# Patient Record
Sex: MALE | Race: WHITE | Employment: OTHER | URBAN - METROPOLITAN AREA
[De-identification: names, ages, dates, MRNs, and addresses within clinical notes are randomized per-mention and may not be internally consistent; named-entity substitution may affect disease eponyms.]

---

## 2022-04-12 ENCOUNTER — APPOINTMENT (OUTPATIENT)
Dept: CT IMAGING | Age: 77
DRG: 872 | End: 2022-04-12
Payer: MEDICARE

## 2022-04-12 ENCOUNTER — HOSPITAL ENCOUNTER (INPATIENT)
Age: 77
LOS: 3 days | Discharge: HOME HEALTH | DRG: 872 | End: 2022-04-16
Attending: EMERGENCY MEDICINE | Admitting: STUDENT IN AN ORGANIZED HEALTH CARE EDUCATION/TRAINING PROGRAM
Payer: MEDICARE

## 2022-04-12 ENCOUNTER — APPOINTMENT (OUTPATIENT)
Dept: GENERAL RADIOLOGY | Age: 77
DRG: 872 | End: 2022-04-12
Payer: MEDICARE

## 2022-04-12 DIAGNOSIS — D72.829 LEUKOCYTOSIS, UNSPECIFIED TYPE: Primary | ICD-10-CM

## 2022-04-12 DIAGNOSIS — N28.9 RENAL INSUFFICIENCY: ICD-10-CM

## 2022-04-12 DIAGNOSIS — N30.00 ACUTE CYSTITIS WITHOUT HEMATURIA: ICD-10-CM

## 2022-04-12 PROBLEM — N17.9 AKI (ACUTE KIDNEY INJURY) (HCC): Status: ACTIVE | Noted: 2022-04-12

## 2022-04-12 PROBLEM — R73.03 PREDIABETES: Status: ACTIVE | Noted: 2018-09-06

## 2022-04-12 PROBLEM — E03.9 ACQUIRED HYPOTHYROIDISM: Status: ACTIVE | Noted: 2018-03-01

## 2022-04-12 PROBLEM — I10 BENIGN ESSENTIAL HYPERTENSION: Status: ACTIVE | Noted: 2017-09-01

## 2022-04-12 PROBLEM — E78.2 MIXED HYPERLIPIDEMIA: Status: ACTIVE | Noted: 2017-09-01

## 2022-04-12 LAB
ALBUMIN SERPL-MCNC: 3.4 GM/DL (ref 3.4–5)
ALP BLD-CCNC: 54 IU/L (ref 40–129)
ALT SERPL-CCNC: 33 U/L (ref 10–40)
ANION GAP SERPL CALCULATED.3IONS-SCNC: 14 MMOL/L (ref 4–16)
AST SERPL-CCNC: 33 IU/L (ref 15–37)
BACTERIA: ABNORMAL /HPF
BASOPHILS ABSOLUTE: 0.1 K/CU MM
BASOPHILS RELATIVE PERCENT: 0.2 % (ref 0–1)
BILIRUB SERPL-MCNC: 0.6 MG/DL (ref 0–1)
BILIRUBIN URINE: NEGATIVE MG/DL
BLOOD, URINE: ABNORMAL
BUN BLDV-MCNC: 29 MG/DL (ref 6–23)
CALCIUM SERPL-MCNC: 9.6 MG/DL (ref 8.3–10.6)
CHLORIDE BLD-SCNC: 96 MMOL/L (ref 99–110)
CLARITY: ABNORMAL
CO2: 22 MMOL/L (ref 21–32)
COLOR: YELLOW
CREAT SERPL-MCNC: 1.8 MG/DL (ref 0.9–1.3)
DIFFERENTIAL TYPE: ABNORMAL
EOSINOPHILS ABSOLUTE: 0 K/CU MM
EOSINOPHILS RELATIVE PERCENT: 0.1 % (ref 0–3)
GFR AFRICAN AMERICAN: 45 ML/MIN/1.73M2
GFR NON-AFRICAN AMERICAN: 37 ML/MIN/1.73M2
GLUCOSE BLD-MCNC: 127 MG/DL (ref 70–99)
GLUCOSE BLD-MCNC: 128 MG/DL (ref 70–99)
GLUCOSE, URINE: NEGATIVE MG/DL
HCT VFR BLD CALC: 41.6 % (ref 42–52)
HEMOGLOBIN: 13.6 GM/DL (ref 13.5–18)
IMMATURE NEUTROPHIL %: 0.7 % (ref 0–0.43)
KETONES, URINE: NEGATIVE MG/DL
LACTATE: 1.4 MMOL/L (ref 0.4–2)
LEUKOCYTE ESTERASE, URINE: ABNORMAL
LYMPHOCYTES ABSOLUTE: 2 K/CU MM
LYMPHOCYTES RELATIVE PERCENT: 7.9 % (ref 24–44)
MCH RBC QN AUTO: 30.8 PG (ref 27–31)
MCHC RBC AUTO-ENTMCNC: 32.7 % (ref 32–36)
MCV RBC AUTO: 94.1 FL (ref 78–100)
MONOCYTES ABSOLUTE: 2.9 K/CU MM
MONOCYTES RELATIVE PERCENT: 11.2 % (ref 0–4)
MUCUS: ABNORMAL HPF
NITRITE URINE, QUANTITATIVE: NEGATIVE
NUCLEATED RBC %: 0 %
PDW BLD-RTO: 13.9 % (ref 11.7–14.9)
PH, URINE: 5.5 (ref 5–8)
PLATELET # BLD: 380 K/CU MM (ref 140–440)
PMV BLD AUTO: 10.2 FL (ref 7.5–11.1)
POTASSIUM SERPL-SCNC: 3.9 MMOL/L (ref 3.5–5.1)
PRO-BNP: 526.2 PG/ML
PROTEIN UA: ABNORMAL MG/DL
RBC # BLD: 4.42 M/CU MM (ref 4.6–6.2)
RBC URINE: 4 /HPF (ref 0–3)
SARS-COV-2, NAAT: NOT DETECTED
SEGMENTED NEUTROPHILS ABSOLUTE COUNT: 20.7 K/CU MM
SEGMENTED NEUTROPHILS RELATIVE PERCENT: 79.9 % (ref 36–66)
SODIUM BLD-SCNC: 132 MMOL/L (ref 135–145)
SOURCE: NORMAL
SPECIFIC GRAVITY UA: 1.02 (ref 1–1.03)
SQUAMOUS EPITHELIAL: 1 /HPF
T4 FREE: 1.24 NG/DL (ref 0.9–1.8)
TOTAL CK: 118 IU/L (ref 38–174)
TOTAL IMMATURE NEUTOROPHIL: 0.17 K/CU MM
TOTAL NUCLEATED RBC: 0 K/CU MM
TOTAL PROTEIN: 7.9 GM/DL (ref 6.4–8.2)
TROPONIN T: <0.01 NG/ML
TSH HIGH SENSITIVITY: 1.22 UIU/ML (ref 0.27–4.2)
UROBILINOGEN, URINE: NORMAL MG/DL (ref 0.2–1)
WBC # BLD: 25.9 K/CU MM (ref 4–10.5)
WBC CLUMP: ABNORMAL /HPF
WBC UA: 110 /HPF (ref 0–2)

## 2022-04-12 PROCEDURE — 87635 SARS-COV-2 COVID-19 AMP PRB: CPT

## 2022-04-12 PROCEDURE — 85025 COMPLETE CBC W/AUTO DIFF WBC: CPT

## 2022-04-12 PROCEDURE — 96361 HYDRATE IV INFUSION ADD-ON: CPT

## 2022-04-12 PROCEDURE — 82140 ASSAY OF AMMONIA: CPT

## 2022-04-12 PROCEDURE — G0378 HOSPITAL OBSERVATION PER HR: HCPCS

## 2022-04-12 PROCEDURE — 36415 COLL VENOUS BLD VENIPUNCTURE: CPT

## 2022-04-12 PROCEDURE — 93005 ELECTROCARDIOGRAM TRACING: CPT | Performed by: EMERGENCY MEDICINE

## 2022-04-12 PROCEDURE — 2580000003 HC RX 258: Performed by: EMERGENCY MEDICINE

## 2022-04-12 PROCEDURE — 71045 X-RAY EXAM CHEST 1 VIEW: CPT

## 2022-04-12 PROCEDURE — 83880 ASSAY OF NATRIURETIC PEPTIDE: CPT

## 2022-04-12 PROCEDURE — 6370000000 HC RX 637 (ALT 250 FOR IP): Performed by: STUDENT IN AN ORGANIZED HEALTH CARE EDUCATION/TRAINING PROGRAM

## 2022-04-12 PROCEDURE — 83605 ASSAY OF LACTIC ACID: CPT

## 2022-04-12 PROCEDURE — 84439 ASSAY OF FREE THYROXINE: CPT

## 2022-04-12 PROCEDURE — 96365 THER/PROPH/DIAG IV INF INIT: CPT

## 2022-04-12 PROCEDURE — 81001 URINALYSIS AUTO W/SCOPE: CPT

## 2022-04-12 PROCEDURE — 80053 COMPREHEN METABOLIC PANEL: CPT

## 2022-04-12 PROCEDURE — 84484 ASSAY OF TROPONIN QUANT: CPT

## 2022-04-12 PROCEDURE — 82962 GLUCOSE BLOOD TEST: CPT

## 2022-04-12 PROCEDURE — 87040 BLOOD CULTURE FOR BACTERIA: CPT

## 2022-04-12 PROCEDURE — 6360000002 HC RX W HCPCS: Performed by: EMERGENCY MEDICINE

## 2022-04-12 PROCEDURE — 74176 CT ABD & PELVIS W/O CONTRAST: CPT

## 2022-04-12 PROCEDURE — 99284 EMERGENCY DEPT VISIT MOD MDM: CPT

## 2022-04-12 PROCEDURE — 84443 ASSAY THYROID STIM HORMONE: CPT

## 2022-04-12 PROCEDURE — 82550 ASSAY OF CK (CPK): CPT

## 2022-04-12 PROCEDURE — 71250 CT THORAX DX C-: CPT

## 2022-04-12 RX ORDER — HYDROCHLOROTHIAZIDE 25 MG/1
6.25 TABLET ORAL DAILY
Status: DISCONTINUED | OUTPATIENT
Start: 2022-04-12 | End: 2022-04-13

## 2022-04-12 RX ORDER — LEVOTHYROXINE SODIUM 0.03 MG/1
50 TABLET ORAL DAILY
Status: DISCONTINUED | OUTPATIENT
Start: 2022-04-13 | End: 2022-04-16 | Stop reason: HOSPADM

## 2022-04-12 RX ORDER — ONDANSETRON 4 MG/1
4 TABLET, ORALLY DISINTEGRATING ORAL EVERY 8 HOURS PRN
Status: DISCONTINUED | OUTPATIENT
Start: 2022-04-12 | End: 2022-04-16 | Stop reason: HOSPADM

## 2022-04-12 RX ORDER — POLYETHYLENE GLYCOL 3350 17 G/17G
17 POWDER, FOR SOLUTION ORAL DAILY PRN
Status: DISCONTINUED | OUTPATIENT
Start: 2022-04-12 | End: 2022-04-16 | Stop reason: HOSPADM

## 2022-04-12 RX ORDER — FENOFIBRATE 160 MG/1
160 TABLET ORAL DAILY
Status: DISCONTINUED | OUTPATIENT
Start: 2022-04-13 | End: 2022-04-16 | Stop reason: HOSPADM

## 2022-04-12 RX ORDER — ONDANSETRON 2 MG/ML
4 INJECTION INTRAMUSCULAR; INTRAVENOUS EVERY 6 HOURS PRN
Status: DISCONTINUED | OUTPATIENT
Start: 2022-04-12 | End: 2022-04-16 | Stop reason: HOSPADM

## 2022-04-12 RX ORDER — LOVASTATIN 40 MG/1
60 TABLET ORAL NIGHTLY
COMMUNITY

## 2022-04-12 RX ORDER — ATORVASTATIN CALCIUM 10 MG/1
10 TABLET, FILM COATED ORAL DAILY
Status: DISCONTINUED | OUTPATIENT
Start: 2022-04-12 | End: 2022-04-16 | Stop reason: HOSPADM

## 2022-04-12 RX ORDER — ACETAMINOPHEN 325 MG/1
650 TABLET ORAL EVERY 6 HOURS PRN
Status: DISCONTINUED | OUTPATIENT
Start: 2022-04-12 | End: 2022-04-16 | Stop reason: HOSPADM

## 2022-04-12 RX ORDER — BISOPROLOL FUMARATE AND HYDROCHLOROTHIAZIDE 10; 6.25 MG/1; MG/1
1 TABLET ORAL DAILY
COMMUNITY

## 2022-04-12 RX ORDER — METOPROLOL SUCCINATE 50 MG/1
100 TABLET, EXTENDED RELEASE ORAL DAILY
Status: DISCONTINUED | OUTPATIENT
Start: 2022-04-12 | End: 2022-04-16 | Stop reason: HOSPADM

## 2022-04-12 RX ORDER — BISOPROLOL FUMARATE AND HYDROCHLOROTHIAZIDE 10; 6.25 MG/1; MG/1
1 TABLET ORAL DAILY
Status: DISCONTINUED | OUTPATIENT
Start: 2022-04-12 | End: 2022-04-12 | Stop reason: CLARIF

## 2022-04-12 RX ORDER — LEVOTHYROXINE SODIUM 0.05 MG/1
1 TABLET ORAL DAILY
COMMUNITY
Start: 2022-02-04

## 2022-04-12 RX ORDER — ACETAMINOPHEN 650 MG/1
650 SUPPOSITORY RECTAL EVERY 6 HOURS PRN
Status: DISCONTINUED | OUTPATIENT
Start: 2022-04-12 | End: 2022-04-16 | Stop reason: HOSPADM

## 2022-04-12 RX ORDER — AMLODIPINE BESYLATE 10 MG/1
10 TABLET ORAL DAILY
COMMUNITY

## 2022-04-12 RX ORDER — AMLODIPINE BESYLATE 5 MG/1
10 TABLET ORAL DAILY
Status: DISCONTINUED | OUTPATIENT
Start: 2022-04-12 | End: 2022-04-16 | Stop reason: HOSPADM

## 2022-04-12 RX ORDER — HEPARIN SODIUM 5000 [USP'U]/ML
5000 INJECTION, SOLUTION INTRAVENOUS; SUBCUTANEOUS EVERY 8 HOURS SCHEDULED
Status: DISCONTINUED | OUTPATIENT
Start: 2022-04-13 | End: 2022-04-16 | Stop reason: HOSPADM

## 2022-04-12 RX ORDER — SODIUM CHLORIDE 9 MG/ML
INJECTION, SOLUTION INTRAVENOUS CONTINUOUS
Status: DISPENSED | OUTPATIENT
Start: 2022-04-12 | End: 2022-04-13

## 2022-04-12 RX ORDER — FENOFIBRATE 160 MG/1
160 TABLET ORAL DAILY
COMMUNITY

## 2022-04-12 RX ORDER — SODIUM CHLORIDE 0.9 % (FLUSH) 0.9 %
5-40 SYRINGE (ML) INJECTION 2 TIMES DAILY
Status: DISCONTINUED | OUTPATIENT
Start: 2022-04-13 | End: 2022-04-16 | Stop reason: HOSPADM

## 2022-04-12 RX ORDER — BISOPROLOL FUMARATE 100 %
POWDER (GRAM) MISCELLANEOUS
Status: ON HOLD | COMMUNITY
End: 2022-04-16 | Stop reason: HOSPADM

## 2022-04-12 RX ORDER — SODIUM CHLORIDE 0.9 % (FLUSH) 0.9 %
5-40 SYRINGE (ML) INJECTION PRN
Status: DISCONTINUED | OUTPATIENT
Start: 2022-04-12 | End: 2022-04-16 | Stop reason: HOSPADM

## 2022-04-12 RX ORDER — SODIUM CHLORIDE 9 MG/ML
INJECTION, SOLUTION INTRAVENOUS PRN
Status: DISCONTINUED | OUTPATIENT
Start: 2022-04-12 | End: 2022-04-16 | Stop reason: HOSPADM

## 2022-04-12 RX ADMIN — CEFTRIAXONE SODIUM 1000 MG: 1 INJECTION, POWDER, FOR SOLUTION INTRAMUSCULAR; INTRAVENOUS at 20:43

## 2022-04-12 RX ADMIN — ATORVASTATIN CALCIUM 10 MG: 10 TABLET, FILM COATED ORAL at 23:28

## 2022-04-12 RX ADMIN — METOPROLOL SUCCINATE 100 MG: 50 TABLET, EXTENDED RELEASE ORAL at 23:28

## 2022-04-12 RX ADMIN — AMLODIPINE BESYLATE 10 MG: 5 TABLET ORAL at 23:28

## 2022-04-12 RX ADMIN — SODIUM CHLORIDE: 9 INJECTION, SOLUTION INTRAVENOUS at 20:40

## 2022-04-12 ASSESSMENT — ENCOUNTER SYMPTOMS
TROUBLE SWALLOWING: 0
ABDOMINAL PAIN: 0
NAUSEA: 0
WHEEZING: 0
COUGH: 0
DIARRHEA: 0
RHINORRHEA: 0
ANAL BLEEDING: 0
VOICE CHANGE: 0
COLOR CHANGE: 0
SHORTNESS OF BREATH: 1
CHEST TIGHTNESS: 0
VOMITING: 0
BLOOD IN STOOL: 0

## 2022-04-12 NOTE — ED PROVIDER NOTES
Triage Chief Complaint:   Fatigue (feels tired and \"has no energy\", states this has been going on for several weeks.)      [place]:  Sammy Saldana is a 78 y.o. male that presents to the emergency department stating he feels tired and \"has no energy for the last month\". States he gets easily short of breath with exertion, no chest pain, no Fevers or chills. States recent UTI on abx from PCP. He states those dysuria symptoms are better. He denies any diaphoresis or syncope. Past Medical History:   Diagnosis Date    Hyperlipidemia     Hypertension      History reviewed. No pertinent surgical history. History reviewed. No pertinent family history. Social History     Socioeconomic History    Marital status:      Spouse name: Not on file    Number of children: Not on file    Years of education: Not on file    Highest education level: Not on file   Occupational History    Not on file   Tobacco Use    Smoking status: Never Smoker    Smokeless tobacco: Never Used   Vaping Use    Vaping Use: Never used   Substance and Sexual Activity    Alcohol use: Never    Drug use: Never    Sexual activity: Not on file   Other Topics Concern    Not on file   Social History Narrative    Not on file     Social Determinants of Health     Financial Resource Strain:     Difficulty of Paying Living Expenses: Not on file   Food Insecurity:     Worried About [address] [employer] in the Last Year: Not on file    Merlin of Food in the Last Year: Not on file   Transportation Needs:     Lack of Transportation (Medical): Not on file    Lack of Transportation (Non-Medical):  Not on file   Physical Activity:     Days of Exercise per Week: Not on file    Minutes of Exercise per Session: Not on file   Stress:     Feeling of Stress : Not on file   Social Connections:     Frequency of Communication with Friends and Family: Not on file    Frequency of Social Gatherings with Friends and Family: Not on file    Attends Scientologist Services: Not on file    Active Member of Clubs or Organizations: Not on file    Attends Club or Organization Meetings: Not on file    Marital Status: Not on file   Intimate Partner Violence:     Fear of Current or Ex-Partner: Not on file    Emotionally Abused: Not on file    Physically Abused: Not on file    Sexually Abused: Not on file   Housing Stability:     Unable to Pay for Housing in the Last Year: Not on file    Number of [place] in the Last Year: Not on file    Unstable Housing in the Last Year: Not on file     Current Facility-Administered Medications   Medication Dose Route Frequency Provider Last Rate Last Admin    cefTRIAXone (ROCEPHIN) 1000 mg IVPB in 50 mL D5W minibag  1,000 mg IntraVENous Once Richar Grsos MD         Current Outpatient Medications   Medication Sig Dispense Refill    lovastatin (MEVACOR) 40 MG tablet Take 60 mg by mouth nightly      amLODIPine (NORVASC) 10 MG tablet Take 10 mg by mouth daily      fenofibrate (TRIGLIDE) 160 MG tablet Take 160 mg by mouth daily      Bisoprolol Fumarate POWD by Does not apply route      bisoprolol-hydroCHLOROthiazide (ZIAC) 10-6.25 MG per tablet Take 1 tablet by mouth daily       No Known Allergies  Nursing Notes Reviewed    ROS:  At least 10 systems reviewed and otherwise negative except as in the [address] Physical Exam:  ED Triage Vitals [04/12/22 1503]   Enc Vitals Group      /74      Pulse 80      Resp 14      Temp 99 °F (37.2 °C)      Temp Source Oral      SpO2 94 %      Weight 195 lb (88.5 kg)      Height 5' 11\" (1.803 m)      Head Circumference       Peak Flow       Pain Score       Pain Loc       Pain Edu? Excl. in [address]? My pulse oximetry interpretation is which is within the normal range    GENERAL APPEARANCE: Awake and alert. Cooperative. No acute distress. HEAD: Normocephalic. Atraumatic. EYES: EOM's grossly intact. Sclera anicteric. ENT: Mucous membranes are moist. Tolerates saliva.  No trismus. NECK: Supple. No meningismus. Trachea midline. HEART: RRR. Radial pulses 2+. LUNGS: Respirations unlabored. CTAB. No wheezing or stridor. No respiratory distress  ABDOMEN: Soft. Non-tender. No guarding or rebound. Normal bowel sounds. EXTREMITIES: No acute deformities. No pitting edema  SKIN: Warm and dry. NEUROLOGICAL: No gross facial drooping. Moves all 4 extremities spontaneously. PSYCHIATRIC: Normal mood.     I have reviewed and interpreted all of the currently available lab results from this visit (if applicable):  Results for orders placed or performed during the hospital encounter of 04/12/22   COVID-19, Rapid    Specimen: Nasopharyngeal   Result Value Ref Range    Source THROAT     SARS-CoV-2, NAAT NOT DETECTED NOT DETECTED   CBC with Auto Differential   Result Value Ref Range    WBC 25.9 (H) 4.0 - 10.5 K/CU MM    RBC 4.42 (L) 4.6 - 6.2 M/CU MM    Hemoglobin 13.6 13.5 - 18.0 GM/DL    Hematocrit 41.6 (L) 42 - 52 %    MCV 94.1 78 - 100 FL    MCH 30.8 27 - 31 PG    MCHC 32.7 32.0 - 36.0 %    RDW 13.9 11.7 - 14.9 %    Platelets 791 872 - 043 K/CU MM    MPV 10.2 7.5 - 11.1 FL    Differential Type AUTOMATED DIFFERENTIAL     Segs Relative 79.9 (H) 36 - 66 %    Lymphocytes % 7.9 (L) 24 - 44 %    Monocytes % 11.2 (H) 0 - 4 %    Eosinophils % 0.1 0 - 3 %    Basophils % 0.2 0 - 1 %    Segs Absolute 20.7 K/CU MM    Lymphocytes Absolute 2.0 K/CU MM    Monocytes Absolute 2.9 K/CU MM    Eosinophils Absolute 0.0 K/CU MM    Basophils Absolute 0.1 K/CU MM    Nucleated RBC % 0.0 %    Total Nucleated RBC 0.0 K/CU MM    Total Immature Neutrophil 0.17 K/CU MM    Immature Neutrophil % 0.7 (H) 0 - 0.43 %   Comprehensive Metabolic Panel   Result Value Ref Range    Sodium 132 (L) 135 - 145 MMOL/L    Potassium 3.9 3.5 - 5.1 MMOL/L    Chloride 96 (L) 99 - 110 mMol/L    CO2 22 21 - 32 MMOL/L    BUN 29 (H) 6 - 23 MG/DL    CREATININE 1.8 (H) 0.9 - 1.3 MG/DL    Glucose 128 (H) 70 - 99 MG/DL    Calcium 9.6 8.3 - 10.6 MG/DL Albumin 3.4 3.4 - 5.0 GM/DL    Total Protein 7.9 6.4 - 8.2 GM/DL    Total Bilirubin 0.6 0.0 - 1.0 MG/DL    ALT 33 10 - 40 U/L    AST 33 15 - 37 IU/L    Alkaline Phosphatase 54 40 - 129 IU/L    GFR Non- 37 (L) >60 mL/min/1.73m2    GFR  45 (L) >60 mL/min/1.73m2    Anion Gap 14 4 - 16   Troponin   Result Value Ref Range    Troponin T <0.010 <0.01 NG/ML   Brain Natriuretic Peptide   Result Value Ref Range    Pro-.2 (H) <300 PG/ML   TSH   Result Value Ref Range    TSH, High Sensitivity 1.220 0.270 - 4.20 uIu/ml   T4, Free   Result Value Ref Range    T4 Free 1.24 0.9 - 1.8 NG/DL   Urinalysis with Microscopic   Result Value Ref Range    Color, UA YELLOW YELLOW    Clarity, UA CLOUDY (A) CLEAR    Glucose, Urine NEGATIVE NEGATIVE MG/DL    Bilirubin Urine NEGATIVE NEGATIVE MG/DL    Ketones, Urine NEGATIVE NEGATIVE MG/DL    Specific Gravity, UA 1.025 1.001 - 1.035    Blood, Urine SMALL (A) NEGATIVE    pH, Urine 5.5 5.0 - 8.0    Protein, UA TRACE (A) NEGATIVE MG/DL    Urobilinogen, Urine NORMAL 0.2 - 1.0 MG/DL    Nitrite Urine, Quantitative NEGATIVE NEGATIVE    Leukocyte Esterase, Urine SMALL (A) NEGATIVE    RBC, UA 4 (H) 0 - 3 /HPF    WBC,  (H) 0 - 2 /HPF    Bacteria, UA MANY (A) NEGATIVE /HPF    WBC Clumps, UA MANY /HPF    Squam Epithel, UA 1 /HPF    Mucus, UA RARE (A) NEGATIVE HPF   Lactic Acid   Result Value Ref Range    Lactate 1.4 0.4 - 2.0 mMOL/L   CK   Result Value Ref Range    Total  38 - 174 IU/L   POCT Glucose   Result Value Ref Range    POC Glucose 127 (H) 70 - 99 MG/DL        Radiographs:  [] Radiologist's Wet Read Report Reviewed:        CT ABDOMEN PELVIS WO CONTRAST Additional Contrast? None (Final result)  Result time 04/12/22 18:39:36  Procedure changed from [place] Additional Contrast? None  Final result by Cris Lares MD (04/12/22 18:39:36)                Impression:    1.  CT CHEST: No acute abnormality.    2. Mild calcific atherosclerosis aorta and coronary arteries. 3. Otherwise unremarkable chest CT.  No finding to suggest etiology for   fatigue and elevated white blood cell count. 4. CT ABDOMEN/PELVIS: Acute cystitis suspected given the findings; correlate   with urinalysis. 5.  No findings to suggest acute appendicitis; no ureter calculus or   hydronephrosis. 6. Mild hepatic morphologic features suggestive of cirrhosis, though   nonspecific. 7. Hepatic steatosis. 8.  Calcific atherosclerotic disease aorta. Narrative:    EXAMINATION:   CT OF THE ABDOMEN AND PELVIS WITHOUT CONTRAST; CT OF THE CHEST WITHOUT   CONTRAST 4/12/2022 6:14 pm     TECHNIQUE:   CT of the abdomen and pelvis was performed without the administration of   intravenous contrast. Multiplanar reformatted images are provided for review. Dose modulation, iterative reconstruction, and/or weight based adjustment of   the mA/kV was utilized to reduce the radiation dose to as low as reasonably   achievable.; CT of the chest was performed without the administration of   intravenous contrast. Multiplanar reformatted images are provided for review. Dose modulation, iterative reconstruction, and/or weight based adjustment of   the mA/kV was utilized to reduce the radiation dose to as low as reasonably   achievable. COMPARISON:   None. HISTORY:   ORDERING SYSTEM PROVIDED HISTORY: white count 25,000, fatigue   TECHNOLOGIST PROVIDED HISTORY:   Reason for exam:-> white count 25 k, fatigue   Additional Contrast?-> none   Reason for Exam: white count 25 k, fatigue     FINDINGS:   Mediastinum: Cardiac structures and great vessels appear unremarkable with   exception of calcific atherosclerotic disease.  No pericardial effusion. Left hilar calcification typical of sequela from old granulomatous disease. Posterior mediastinal structures appear unremarkable.  No mediastinal or   hilar adenopathy.      Lungs/pleura: Densely calcified nodules lateral mid left and right lung,   typical of sequela from old granulomatous disease.  Well-expanded and   otherwise clear, without soft tissue or ground-glass pulmonary nodule.  No   pleural effusion or pneumothorax.  No inspissated secretions or endobronchial   lesion evident. Organs: Mild hepatic morphologic features typical of cirrhosis.  The liver   appears fatty and diffusely mildly heterogeneous.  No discrete mass lesion   evident.  The gallbladder, spleen, pancreas, adrenal glands and kidneys   appear unremarkable. GI/Bowel: No diffuse or focal bowel wall thickening evident. No inflammatory   changes evident.  No obstruction is seen.  The normal appendix is seen right   lower quadrant. Pelvis: Prostate gland and seminal vesicles appear unremarkable.  Urinary   bladder is partially filled, diffusely mild thick walled appearance.  Mild   pericystic inflammatory changes.  No adenopathy or free fluid. Peritoneum/Retroperitoneum: Mild calcific atherosclerosis of the aorta,   otherwise unremarkable appearance of the aorta with no aneurysm. Unremarkable appearance of the IVC. No adenopathy or fluid. Bones/Soft Tissues: No acute superficial soft tissue or osseous structure   abnormality evident.                       CT CHEST WO CONTRAST (Final result)  Result time 04/12/22 18:39:36  Procedure changed from [facility]  Final result by Letty Thorne MD (04/12/22 18:39:36)                Impression:    1.  CT CHEST: No acute abnormality. 2. Mild calcific atherosclerosis aorta and coronary arteries. 3. Otherwise unremarkable chest CT.  No finding to suggest etiology for   fatigue and elevated white blood cell count. 4. CT ABDOMEN/PELVIS: Acute cystitis suspected given the findings; correlate   with urinalysis. 5.  No findings to suggest acute appendicitis; no ureter calculus or   hydronephrosis.    6. Mild hepatic morphologic features suggestive of cirrhosis, though nonspecific. 7. Hepatic steatosis. 8.  Calcific atherosclerotic disease aorta. Narrative:    EXAMINATION:   CT OF THE ABDOMEN AND PELVIS WITHOUT CONTRAST; CT OF THE CHEST WITHOUT   CONTRAST 4/12/2022 6:14 pm     TECHNIQUE:   CT of the abdomen and pelvis was performed without the administration of   intravenous contrast. Multiplanar reformatted images are provided for review. Dose modulation, iterative reconstruction, and/or weight based adjustment of   the mA/kV was utilized to reduce the radiation dose to as low as reasonably   achievable.; CT of the chest was performed without the administration of   intravenous contrast. Multiplanar reformatted images are provided for review. Dose modulation, iterative reconstruction, and/or weight based adjustment of   the mA/kV was utilized to reduce the radiation dose to as low as reasonably   achievable. COMPARISON:   None. HISTORY:   ORDERING SYSTEM PROVIDED HISTORY: white count 25,000, fatigue   TECHNOLOGIST PROVIDED HISTORY:   Reason for exam:-> white count 25 k, fatigue   Additional Contrast?-> none   Reason for Exam: white count 25 k, fatigue     FINDINGS:   Mediastinum: Cardiac structures and great vessels appear unremarkable with   exception of calcific atherosclerotic disease.  No pericardial effusion. Left hilar calcification typical of sequela from old granulomatous disease. Posterior mediastinal structures appear unremarkable.  No mediastinal or   hilar adenopathy. Lungs/pleura: Densely calcified nodules lateral mid left and right lung,   typical of sequela from old granulomatous disease.  Well-expanded and   otherwise clear, without soft tissue or ground-glass pulmonary nodule.  No   pleural effusion or pneumothorax.  No inspissated secretions or endobronchial   lesion evident.      Organs: Mild hepatic morphologic features typical of cirrhosis.  The liver   appears fatty and diffusely mildly heterogeneous.  No discrete mass lesion   evident.  The gallbladder, spleen, pancreas, adrenal glands and kidneys   appear unremarkable. GI/Bowel: No diffuse or focal bowel wall thickening evident. No inflammatory   changes evident.  No obstruction is seen.  The normal appendix is seen right   lower quadrant. Pelvis: Prostate gland and seminal vesicles appear unremarkable.  Urinary   bladder is partially filled, diffusely mild thick walled appearance.  Mild   pericystic inflammatory changes.  No adenopathy or free fluid. Peritoneum/Retroperitoneum: Mild calcific atherosclerosis of the aorta,   otherwise unremarkable appearance of the aorta with no aneurysm. Unremarkable appearance of the IVC. No adenopathy or fluid. Bones/Soft Tissues: No acute superficial soft tissue or osseous structure   abnormality evident.                       XR CHEST PORTABLE (Final result)  Result time 04/12/22 18:51:14  Final result by Selene Tabor MD (04/12/22 18:51:14)                Impression:    No acute process. Narrative:    EXAMINATION:   ONE XRAY VIEW OF THE CHEST     4/12/2022 4:42 pm     COMPARISON:   CT chest, abdomen, and pelvis same day     HISTORY:   ORDERING SYSTEM PROVIDED HISTORY: fatigued, easily SOB with exertion   TECHNOLOGIST PROVIDED HISTORY:   Reason for exam:->fatigued, easily SOB with exertion   Reason for Exam: fatigued, easily SOB with exertion     FINDINGS:   The lungs are without acute focal process.  There is no effusion or   pneumothorax. The cardiomediastinal silhouette is without acute process. The   osseous structures are without acute process. [] Discussed with Radiologist:     [] The following radiograph was interpreted by myself in the absence of a radiologist:     EKG: (All EKG's are interpreted by myself in the absence of a cardiologist)  The Ekg interpreted by me shows  normal sinus rhythm with a rate of 77  Axis is   Normal  QTc is  normal  Intervals and Durations are unremarkable. ST Segments: no acute change  No old EKG           MDM:  Patient is normotensive. Afebrile. Heart rate in the 80s. Sats 99% on room air. Accu-Chek normal at 127. EKG normal sinus rhythm at 77 without any acute findings. CBC shows a white count of 25,000. Hemoglobin of 13. 6. lactic acid 1.4. CMP sodium of 132, chloride of 96, creatinine of 1.8 glucose is 128. Normal LFTs. Anion gap is 14. Troponin normal. BNP is 526. [facility] TSH normal. Free t4 normal. CK normal. covid negative. CT chest shows no acute abnormality. Mild calcific atherosclerosis of the aorta and coronary arteries. . Ct abdomen no acute appendicitis. No ureter calculus or hydronephrosis. Hepatic steatosis. Calcific disease of the aorta mild hepatic morphological features suggestive of cirrhosis though nonspecific. .  Bladder is partially filled, diffusely thickened throughout. Mild pericystic inflammatory changes. Urinalysis shows small leuks, 110 WBCs with many bacteria. Patient will be started on IV Rocephin and IV infusion of fluids for renal insufficiency, white count of 26,000, UTI, failed outpatient. .     Clinical Impression:  1. Leukocytosis, unspecified type    2. Acute cystitis without hematuria    3. Renal insufficiency        Disposition Vitals:  [unfilled], [unfilled], [unfilled], [unfilled]    Disposition referral (if applicable):  No follow-up provider specified.     Disposition medications (if applicable):  New Prescriptions    No medications on file         (Please note that portions of this note may have been completed with a voice recognition program. Efforts were made to edit the dictations but occasionally words are mis-transcribed.)    MD Indra Francis MD  04/12/22 2004       Indra Camargo MD  04/12/22 2005

## 2022-04-13 PROBLEM — A41.9 SEPSIS (HCC): Status: ACTIVE | Noted: 2022-04-13

## 2022-04-13 LAB
AMMONIA: 27 UMOL/L (ref 16–60)
ANION GAP SERPL CALCULATED.3IONS-SCNC: 13 MMOL/L (ref 4–16)
BASOPHILS ABSOLUTE: 0 K/CU MM
BASOPHILS RELATIVE PERCENT: 0.2 % (ref 0–1)
BUN BLDV-MCNC: 28 MG/DL (ref 6–23)
CALCIUM SERPL-MCNC: 9.1 MG/DL (ref 8.3–10.6)
CHLORIDE BLD-SCNC: 101 MMOL/L (ref 99–110)
CO2: 22 MMOL/L (ref 21–32)
CREAT SERPL-MCNC: 1.5 MG/DL (ref 0.9–1.3)
CREATININE URINE: 139.7 MG/DL
DIFFERENTIAL TYPE: ABNORMAL
EKG ATRIAL RATE: 77 BPM
EKG DIAGNOSIS: NORMAL
EKG P AXIS: -28 DEGREES
EKG P-R INTERVAL: 164 MS
EKG Q-T INTERVAL: 362 MS
EKG QRS DURATION: 90 MS
EKG QTC CALCULATION (BAZETT): 409 MS
EKG R AXIS: 52 DEGREES
EKG T AXIS: 55 DEGREES
EKG VENTRICULAR RATE: 77 BPM
EOSINOPHILS ABSOLUTE: 0 K/CU MM
EOSINOPHILS RELATIVE PERCENT: 0.1 % (ref 0–3)
GFR AFRICAN AMERICAN: 55 ML/MIN/1.73M2
GFR NON-AFRICAN AMERICAN: 46 ML/MIN/1.73M2
GLUCOSE BLD-MCNC: 127 MG/DL (ref 70–99)
HCT VFR BLD CALC: 37.9 % (ref 42–52)
HEMOGLOBIN: 12.7 GM/DL (ref 13.5–18)
IMMATURE NEUTROPHIL %: 0.7 % (ref 0–0.43)
LACTATE: 1 MMOL/L (ref 0.4–2)
LYMPHOCYTES ABSOLUTE: 1.9 K/CU MM
LYMPHOCYTES RELATIVE PERCENT: 8.7 % (ref 24–44)
MCH RBC QN AUTO: 31.1 PG (ref 27–31)
MCHC RBC AUTO-ENTMCNC: 33.5 % (ref 32–36)
MCV RBC AUTO: 92.7 FL (ref 78–100)
MONOCYTES ABSOLUTE: 2.4 K/CU MM
MONOCYTES RELATIVE PERCENT: 10.9 % (ref 0–4)
NUCLEATED RBC %: 0 %
PDW BLD-RTO: 13.9 % (ref 11.7–14.9)
PLATELET # BLD: 326 K/CU MM (ref 140–440)
PMV BLD AUTO: 9.6 FL (ref 7.5–11.1)
POTASSIUM SERPL-SCNC: 4.1 MMOL/L (ref 3.5–5.1)
RBC # BLD: 4.09 M/CU MM (ref 4.6–6.2)
SEGMENTED NEUTROPHILS ABSOLUTE COUNT: 17.6 K/CU MM
SEGMENTED NEUTROPHILS RELATIVE PERCENT: 79.4 % (ref 36–66)
SODIUM BLD-SCNC: 136 MMOL/L (ref 135–145)
SODIUM URINE: 36 MMOL/L (ref 35–167)
TOTAL IMMATURE NEUTOROPHIL: 0.16 K/CU MM
TOTAL NUCLEATED RBC: 0 K/CU MM
TROPONIN T: <0.01 NG/ML
TROPONIN T: <0.01 NG/ML
WBC # BLD: 22.2 K/CU MM (ref 4–10.5)

## 2022-04-13 PROCEDURE — 6360000002 HC RX W HCPCS: Performed by: STUDENT IN AN ORGANIZED HEALTH CARE EDUCATION/TRAINING PROGRAM

## 2022-04-13 PROCEDURE — 1200000000 HC SEMI PRIVATE

## 2022-04-13 PROCEDURE — 93010 ELECTROCARDIOGRAM REPORT: CPT | Performed by: INTERNAL MEDICINE

## 2022-04-13 PROCEDURE — G0378 HOSPITAL OBSERVATION PER HR: HCPCS

## 2022-04-13 PROCEDURE — 97116 GAIT TRAINING THERAPY: CPT

## 2022-04-13 PROCEDURE — 85025 COMPLETE CBC W/AUTO DIFF WBC: CPT

## 2022-04-13 PROCEDURE — 94761 N-INVAS EAR/PLS OXIMETRY MLT: CPT

## 2022-04-13 PROCEDURE — 96361 HYDRATE IV INFUSION ADD-ON: CPT

## 2022-04-13 PROCEDURE — 80048 BASIC METABOLIC PNL TOTAL CA: CPT

## 2022-04-13 PROCEDURE — 83605 ASSAY OF LACTIC ACID: CPT

## 2022-04-13 PROCEDURE — 2580000003 HC RX 258: Performed by: STUDENT IN AN ORGANIZED HEALTH CARE EDUCATION/TRAINING PROGRAM

## 2022-04-13 PROCEDURE — 87040 BLOOD CULTURE FOR BACTERIA: CPT

## 2022-04-13 PROCEDURE — 2580000003 HC RX 258: Performed by: NURSE PRACTITIONER

## 2022-04-13 PROCEDURE — 82140 ASSAY OF AMMONIA: CPT

## 2022-04-13 PROCEDURE — 6370000000 HC RX 637 (ALT 250 FOR IP): Performed by: STUDENT IN AN ORGANIZED HEALTH CARE EDUCATION/TRAINING PROGRAM

## 2022-04-13 PROCEDURE — 96366 THER/PROPH/DIAG IV INF ADDON: CPT

## 2022-04-13 PROCEDURE — 82570 ASSAY OF URINE CREATININE: CPT

## 2022-04-13 PROCEDURE — 36415 COLL VENOUS BLD VENIPUNCTURE: CPT

## 2022-04-13 PROCEDURE — 84484 ASSAY OF TROPONIN QUANT: CPT

## 2022-04-13 PROCEDURE — 87077 CULTURE AEROBIC IDENTIFY: CPT

## 2022-04-13 PROCEDURE — 87086 URINE CULTURE/COLONY COUNT: CPT

## 2022-04-13 PROCEDURE — 97530 THERAPEUTIC ACTIVITIES: CPT

## 2022-04-13 PROCEDURE — 84300 ASSAY OF URINE SODIUM: CPT

## 2022-04-13 PROCEDURE — 97162 PT EVAL MOD COMPLEX 30 MIN: CPT

## 2022-04-13 PROCEDURE — 87186 SC STD MICRODIL/AGAR DIL: CPT

## 2022-04-13 PROCEDURE — 96372 THER/PROPH/DIAG INJ SC/IM: CPT

## 2022-04-13 RX ORDER — SODIUM CHLORIDE 9 MG/ML
INJECTION, SOLUTION INTRAVENOUS CONTINUOUS
Status: ACTIVE | OUTPATIENT
Start: 2022-04-13 | End: 2022-04-13

## 2022-04-13 RX ADMIN — FENOFIBRATE 160 MG: 160 TABLET ORAL at 10:00

## 2022-04-13 RX ADMIN — CEFTRIAXONE 1000 MG: 1 INJECTION, POWDER, FOR SOLUTION INTRAMUSCULAR; INTRAVENOUS at 20:50

## 2022-04-13 RX ADMIN — HEPARIN SODIUM 5000 UNITS: 5000 INJECTION INTRAVENOUS; SUBCUTANEOUS at 22:41

## 2022-04-13 RX ADMIN — HEPARIN SODIUM 5000 UNITS: 5000 INJECTION INTRAVENOUS; SUBCUTANEOUS at 05:38

## 2022-04-13 RX ADMIN — AMLODIPINE BESYLATE 10 MG: 5 TABLET ORAL at 10:00

## 2022-04-13 RX ADMIN — ACETAMINOPHEN 650 MG: 325 TABLET ORAL at 03:53

## 2022-04-13 RX ADMIN — METOPROLOL SUCCINATE 100 MG: 50 TABLET, EXTENDED RELEASE ORAL at 10:00

## 2022-04-13 RX ADMIN — ATORVASTATIN CALCIUM 10 MG: 10 TABLET, FILM COATED ORAL at 20:46

## 2022-04-13 RX ADMIN — ACETAMINOPHEN 650 MG: 325 TABLET ORAL at 20:46

## 2022-04-13 RX ADMIN — LEVOTHYROXINE SODIUM 50 MCG: 25 TABLET ORAL at 05:38

## 2022-04-13 RX ADMIN — SODIUM CHLORIDE: 9 INJECTION, SOLUTION INTRAVENOUS at 17:12

## 2022-04-13 ASSESSMENT — PAIN SCALES - GENERAL
PAINLEVEL_OUTOF10: 0
PAINLEVEL_OUTOF10: 3
PAINLEVEL_OUTOF10: 0
PAINLEVEL_OUTOF10: 2
PAINLEVEL_OUTOF10: 5

## 2022-04-13 ASSESSMENT — ENCOUNTER SYMPTOMS
EYES NEGATIVE: 1
GASTROINTESTINAL NEGATIVE: 1
RESPIRATORY NEGATIVE: 1

## 2022-04-13 NOTE — PLAN OF CARE
Nutrition Problem #1: Inadequate oral intake  Intervention: Food and/or Nutrient Delivery: Continue Current Diet,Start Oral Nutrition Supplement  Nutritional Goals: Pt will consume greater than 50% of meals and supplements

## 2022-04-13 NOTE — PROGRESS NOTES
Physical Therapy    Facility/Department: [facility] 4E  Initial Assessment    NAME: Herminio Galvan  : 1945  MRN: [id number]    Date of Service: 2022    Discharge Recommendations:  24 hour supervision or assist,Home with Home health PT,Outpatient PT       Functional Outcome Measure:    Acute Care Index of Function (ACIF):    Score: 0.94 (0.71 or greater = appropriate for home with home PT or OP PT and 24 hour supervision/assist)      Assessment   Assessment: Pt is a 78 y.o. male with medical history, surgical history, co-morbidities, and personal factors including Hyperlipidemia and Hypertension with admission for Leukocytosis, Acute cystitis without hematuria, and Renal insufficiency. Prior to admission, pt was independent with functional mobility and ADLs. Examination of body systems reveals decreased endurance which limits his overall functional mobility. Prognosis: Good  Decision Making: Medium Complexity  Clinical Presentation: evolving  PT Education: Goals;PT Role;Plan of Care;Equipment; Functional Mobility Training;Transfer Training;General Safety; Adaptive Device Training;Gait Training  REQUIRES PT FOLLOW UP: Yes  Activity Tolerance  Activity Tolerance: Patient Tolerated treatment well         Restrictions  Restrictions/Precautions  Restrictions/Precautions: General Precautions  Vision/Hearing  Vision: Within Functional Limits  Hearing: Within functional limits     Subjective  General  Chart Reviewed: Yes  Patient assessed for rehabilitation services?: Yes  Family / Caregiver Present: No  Follows Commands: Within Functional Limits  Pain Screening  Patient Currently in Pain: Denies  Vital Signs  Patient Currently in Pain: Denies       Orientation  Orientation  Overall Orientation Status: Within Functional Limits  Orientation Level: Disoriented to time;Oriented to person  Social/Functional History  Social/Functional History  Lives With: Alone  Type of Home: House  Home Layout: Bed/Bath upstairs,Two level (12 steps with a handrail to access bed/bath)  Home Access: Stairs to enter with rails  Entrance Stairs - Number of Steps: 1  Bathroom Shower/Tub: Tub/Shower unit  Bathroom Toilet: Standard  ADL Assistance: Independent  Homemaking Assistance: Independent  Ambulation Assistance: Independent  Transfer Assistance: Independent  Active : Yes  Occupation: Retired  Type of occupation: [employer]   Cognition   Cognition  Overall Cognitive Status: WNL    Objective             Strength RLE  Strength RLE: WFL  Strength LLE  Strength LLE: WFL     Sensation  Overall Sensation Status: WNL  Bed mobility  Rolling to Left: Independent  Rolling to Right: Independent  Supine to Sit: Independent  Sit to Supine: Independent  Transfers  Sit to Stand: Supervision  Stand to sit: Supervision  Ambulation  Ambulation?: Yes  Ambulation 1  Surface: level tile  Device: No Device  Assistance: Stand by assistance;Supervision  Quality of Gait: decreased gait speed, decreased step length bilaterally  Distance: 120 feet + 120 feet  Comments: with initial verbal cues for directions in order to successfully navigate hallway and return to correct room  Stairs/Curb  Stairs?: Yes  Stairs  # Steps : 3  Rails: Left ascending  Assistance: Stand by assistance;Contact guard assistance     Balance  Posture: Fair  Sitting - Static: Good  Sitting - Dynamic: Good  Standing - Static: Good  Standing - Dynamic: Good      Gait Training:  Cues were given for safety, sequence, device management, balance, posture, awareness, path. Therapeutic Activity Training:   Therapeutic activity training was instructed today. Pt educated on and demonstrated understanding of importance of regular OOB mobility/ambulation with nursing staff and/or therapy staff throughout remainder of hospital stay.         Plan   Plan  Times per week: 3+  Current Treatment Recommendations: Uday Russo Mobility Training,Transfer Training,ADL/Self-care Training,Gait Training,Patient/Caregiver Education & Training,IADL Training,Stair training,Equipment Evaluation, Education, & procurement,Neuromuscular Re-education,Pain Management,Home Exercise [employer] Devices  Type of devices: All fall risk precautions in place,Left in bed,Bed alarm in place,Call light within reach,Nurse notified,Gait belt      AM-PAC Score  AM-PAC Inpatient Mobility Raw Score : 22 (04/13/22 1630)  AM-PAC Inpatient T-Scale Score : 53.28 (04/13/22 1630)  Mobility Inpatient CMS 0-100% Score: 20.91 (04/13/22 1630)  Mobility Inpatient CMS G-Code Modifier : CJ (04/13/22 1630)          Goals  Long term goals  Time Frame for Long term goals :  In one week:  Long term goal 1: Pt will complete all transfers independently  Long term goal 2: Pt will ambulate 500 feet independently  Long term goal 3: Pt will independently ascend/descend 12 steps with a handrail  Long term goal 4: Pt will independently complete 3 sets of 10 reps of BLE AROM exercises in available and allowed ROM       Time In: 1137  Time Out: 1219  Total Treatment Time: 42  Timed Code Treatment Minutes: [facility] 166 Bibi Nieto PT, DPT  License #: [id number]

## 2022-04-13 NOTE — PLAN OF CARE
Problem: Sensory:  Goal: General experience of comfort will improve  Description: General experience of comfort will improve  4/13/2022 1031 by William Marie RN  Outcome: Met This Shift  4/13/2022 0002 by Morgan Sorensen LPN  Outcome: Ongoing

## 2022-04-13 NOTE — H&P
History and Physical      Name:  Ivana Go /Age/Sex: 1945  (78 y.o. male)   MRN & CSN:  [id number] & [id number] Encounter Date/Time: 2022 8:34 PM EDT   Location:  [address] [place] [address] PCP: Phyllis Coker MD       Hospital Day: 2    Assessment and Plan:   Ivana Go is a 78 y.o. male with a pmh of CKD stage IIIa, HTN, HLD, and hypothyroidism, who presents with ANTHONY (acute kidney injury) [facility]    Hospital Problems           Last Modified POA    * (Principal) ANTHONY (acute kidney injury) ([facility] [address] 2022 Yes          1. Acute kidney injury likely secondary to prerenal azotemia on CKD stage 3a  Admit to observation services  Cr 1.8 in ED w baseline ~1.3 per EMR  IVF with NS at 125 mL an hour for 10 hours augmented with oral rehydration  Follow Cr with daily labs and monitor I & O  Urinalysis with microscopic, urine sodium, and urine creatinine  Holding home based prolonged hydrochlorothiazide  Heparin for DVT ppx  No known CKD listed on problem list.  Review of records shows GFR baseline roughly 55-60. Recommend outpatient follow-up with nephrology  Consideration for bilateral renal ultrasound and nephrology consult if labs do not improve with IVF    2. UTI, complicated  3. Failure of outpatient therapy  1. Patient failed 10-day course of outpatient oral antibiotics  2. ED started patient on ceftriaxone and we will continue this pending sensitivities    4. Fatigue  1. Fatigue is likely multifactorial 2/2 above  2. PT and OT when appropriate    Other chronic medical conditions:   HTN  HLD  Hypothyroidism    Diet ADULT DIET;  Regular; Low Fat/Low Chol/High Fiber/MIRNA; No Added Salt (3-4 gm)   DVT Prophylaxis [] Lovenox, [x]  Heparin, [] SCDs, [] Ambulation,  [] Eliquis, [] Xarelto   Code Status Full Code     History from:     patient    History of Present Illness:     Chief Complaint: [facility]  Ivana Go is a 78 y.o. male with pmh of CKD stage IIIa, HTN, HLD, and hypothyroidism, who presents with complaints of fatigue. Symptoms began 3 days ago. Sentinal symptom the patient feels fatigue began with: exercise intolerance. Symptoms of his fatigue have been generalized malaise, myalgias, and dyspnea on exertion. Patient denies fever, significant change in weight, symptoms of arthritis, unusual rashes, cold intolerance, constipation and change in hair texture, hematemesis, melena, hematochezia, or depression. Patient states he had dysuria about 20 days ago and completed a 10-day course of antibiotics. Patient states dysuria, hematuria, and frequency are gone. Otherwise patient denies chills, headache, chest pain, shortness of breath, cough, abdominal pain, or constipation. Discussed case with ED provider. Review of Systems:   Review of Systems   Constitutional: Positive for activity change and fatigue. Negative for appetite change, chills, diaphoresis and fever. HENT: Negative for congestion, rhinorrhea, tinnitus, trouble swallowing and voice change. Eyes: Negative for visual disturbance. Respiratory: Positive for shortness of breath (GODWIN). Negative for cough, chest tightness and wheezing. Cardiovascular: Negative for chest pain, palpitations and leg swelling. Gastrointestinal: Negative for abdominal pain, anal bleeding, blood in stool, diarrhea, nausea and vomiting. Genitourinary: Negative for dysuria, frequency, hematuria and urgency. Musculoskeletal: Positive for arthralgias. Skin: Negative for color change and rash. Neurological: Negative for dizziness, tremors, seizures, syncope, facial asymmetry, speech difficulty, weakness, light-headedness, numbness and headaches. Psychiatric/Behavioral: Negative for dysphoric mood.        Objective:   No intake or output data in the 24 hours ending 04/13/22 0042   Vitals:   Vitals:    04/12/22 2201 04/12/22 2232 04/12/22 2247 04/12/22 2300   BP: 138/70 136/63  (!) 141/67   Pulse:    96   Resp:    16   Temp:    100.2 °F (37.9 °C) TempSrc:    Oral   SpO2: 98% 95% 97% 96%   Weight:       Height:           Medications Prior to Admission     Prior to Admission medications    Medication Sig Start Date End Date Taking? Authorizing Provider   lovastatin (MEVACOR) 40 MG tablet Take 60 mg by mouth nightly   Yes Historical Provider, MD   amLODIPine (NORVASC) 10 MG tablet Take 10 mg by mouth daily   Yes Historical Provider, MD   fenofibrate (TRIGLIDE) 160 MG tablet Take 160 mg by mouth daily   Yes Historical Provider, MD   Bisoprolol Fumarate POWD by Does not apply route   Yes Historical Provider, MD   bisoprolol-hydroCHLOROthiazide (ZIAC) 10-6.25 MG per tablet Take 1 tablet by mouth daily   Yes Historical Provider, MD   levothyroxine (SYNTHROID) 50 MCG tablet Take 1 tablet by mouth daily 2/4/22   Historical Provider, MD       Physical Exam:    Physical Exam  Vitals and nursing note reviewed. Constitutional:       General: He is awake. He is not in acute distress. Appearance: Normal appearance. He is overweight. He is not ill-appearing, toxic-appearing or diaphoretic. Interventions: He is not intubated. HENT:      Head: Atraumatic. Right Ear: External ear normal.      Left Ear: External ear normal.      Nose: Nose normal. No rhinorrhea. Mouth/Throat:      Mouth: Mucous membranes are dry. Eyes:      General: No scleral icterus. Conjunctiva/sclera: Conjunctivae normal.   Cardiovascular:      Rate and Rhythm: Normal rate and regular rhythm. Pulses: Normal pulses. Pulmonary:      Effort: Pulmonary effort is normal. No tachypnea or respiratory distress. He is not intubated. Breath sounds: Normal breath sounds. No wheezing, rhonchi or rales. Abdominal:      General: Bowel sounds are normal. There is no distension. Palpations: Abdomen is soft. Tenderness: There is no abdominal tenderness. There is no guarding or rebound. Musculoskeletal:         General: Normal range of motion.       Cervical back: Neck supple. Right lower leg: No edema. Left lower leg: No edema. Skin:     General: Skin is warm and dry. Capillary Refill: Capillary refill takes less than 2 seconds. Neurological:      Mental Status: He is alert and oriented to person, place, and time. Mental status is at baseline. Cranial Nerves: No dysarthria or facial asymmetry. Motor: No tremor or seizure activity. Psychiatric:         Mood and Affect: Mood is not anxious. Speech: He is communicative. Speech is not slurred. Behavior: Behavior is cooperative. Past Medical History:   PMHx   Past Medical History:   Diagnosis Date    Hyperlipidemia     Hypertension      PSHX:  has no past surgical history on file. Allergies: No Known Allergies  Fam HX: Reviewed family history includes Coronary Art Dis in his father; Hypertension in his father; Tuberculosis in his mother. Soc HX:   Social History     Socioeconomic History    Marital status:      Spouse name: None    Number of children: None    Years of education: None    Highest education level: None   Occupational History    None   Tobacco Use    Smoking status: Never Smoker    Smokeless tobacco: Never Used   Vaping Use    Vaping Use: Never used   Substance and Sexual Activity    Alcohol use: Never    Drug use: Never    Sexual activity: None   Other Topics Concern    None   Social History Narrative    None     Social Determinants of Health     Financial Resource Strain:     Difficulty of Paying Living Expenses: Not on file   Food Insecurity:     Worried About Running Out of Food in the Last Year: Not on file    Merlin of Food in the Last Year: Not on file   Transportation Needs:     Lack of Transportation (Medical): Not on file    Lack of Transportation (Non-Medical):  Not on file   Physical Activity:     Days of Exercise per Week: Not on file    Minutes of Exercise per Session: Not on file   Stress:     Feeling of Stress : Not on Component Value Date    TROPONINT <0.010 04/12/2022     Lactic Acid: No results for input(s): LACTA in the last 72 hours. BNP:   Recent Labs     04/12/22  1541   PROBNP 526.2*     UA:  Lab Results   Component Value Date    NITRU NEGATIVE 04/12/2022    COLORU YELLOW 04/12/2022    WBCUA 110 04/12/2022    RBCUA 4 04/12/2022    MUCUS RARE 04/12/2022    BACTERIA MANY 04/12/2022    CLARITYU CLOUDY 04/12/2022    SPECGRAV 1.025 04/12/2022    LEUKOCYTESUR SMALL 04/12/2022    UROBILINOGEN NORMAL 04/12/2022    BILIRUBINUR NEGATIVE 04/12/2022    BLOODU SMALL 04/12/2022    KETUA NEGATIVE 04/12/2022     Urine Cultures: No results found for: LABURIN  Blood Cultures: No results found for: BC  No results found for: BLOODCULT2  Organism: No results found for: ORG    Imaging/Diagnostics Last 24 Hours   CT ABDOMEN PELVIS WO CONTRAST Additional Contrast? None    Result Date: 4/12/2022  1. CT CHEST: No acute abnormality. 2. Mild calcific atherosclerosis aorta and coronary arteries. 3. Otherwise unremarkable chest CT. No finding to suggest etiology for fatigue and elevated white blood cell count. 4. CT ABDOMEN/PELVIS: Acute cystitis suspected given the findings; correlate with urinalysis. 5.  No findings to suggest acute appendicitis; no ureter calculus or hydronephrosis. 6. Mild hepatic morphologic features suggestive of cirrhosis, though nonspecific. 7. Hepatic steatosis. 8.  Calcific atherosclerotic disease aorta. CT CHEST WO CONTRAST    Result Date: 4/12/2022  1. CT CHEST: No acute abnormality. 2. Mild calcific atherosclerosis aorta and coronary arteries. 3. Otherwise unremarkable chest CT. No finding to suggest etiology for fatigue and elevated white blood cell count. 4. CT ABDOMEN/PELVIS: Acute cystitis suspected given the findings; correlate with urinalysis. 5.  No findings to suggest acute appendicitis; no ureter calculus or hydronephrosis.  6. Mild hepatic morphologic features suggestive of cirrhosis, though nonspecific. 7. Hepatic steatosis. 8.  Calcific atherosclerotic disease aorta. XR CHEST PORTABLE    Result Date: 4/12/2022  No acute process. Personally reviewed lab work and imaging. This dictation was created with voice recognition software. While attempts have been made to review the dictation as it is transcribed, on occasion the spoken word can be misinterpreted by the technology leading to omissions or inappropriate words, phrases or sentences.      Electronically signed by Jessi Ceballos DO on 4/13/2022 at 12:42 AM

## 2022-04-13 NOTE — ED NOTES
Report given to nurse burroughs on floor. Pt to be transported to floor.       Ana Camargo RN  04/12/22 0598

## 2022-04-13 NOTE — ED NOTES
Family leaving numbers in case needed.    Ignacio Rasmussen cell number [phone]  Janet Joshi cell number [phone]         Marita Sagastume RN  04/12/22 2029

## 2022-04-13 NOTE — PROGRESS NOTES
Comprehensive Nutrition Assessment    Type and Reason for Visit:  Positive Nutrition Screen    Nutrition Recommendations/Plan:   · Add standard oral nutrition supplement  · Encourage po intake as able  · Please document all po intake  · Will closely monitor po intake, weight trends, poc    Nutrition Assessment:  Pt admitted for renal insufficiency, ANTHONY, sepsis, PMH: HTN, HLD, Hypothyroid, positive nutrition screen for weight loss and decreased appetite, pt currently on cardiac/MIRNA diet, no po intake documented at this time, visited pt at bedside, pt reports decreased appetite/wt loss x 1 mo, reports UBW ~225# 1 month ago, unable to verify weight loss with documented weight hx, pt denies any chewing or swallowing difficulty, denies following any special diet PTA, will follow pt at high nutrition risk    Malnutrition Assessment:  Malnutrition Status:  Insufficient data    Context:  Acute Illness       Estimated Daily Nutrient Needs:  Energy (kcal):  6187-5677 (22-25 kcal/kg); Weight Used for Energy Requirements:  Current     Protein (g):  89-98 (1.0-1.1 g/kg); Weight Used for Protein Requirements:  Current        Fluid (ml/day):  2000; Method Used for Fluid Requirements:  1 ml/kcal      Nutrition Related Findings:  WBC 22.2      Wounds:  None       Current Nutrition Therapies:    ADULT DIET; Regular; Low Fat/Low Chol/High Fiber/MIRNA; No Added Salt (3-4 gm)    Anthropometric Measures:  · Height: 5' 10.98\" (180.3 cm)  · Current Body Weight: 196 lb 6.9 oz (89.1 kg)   · Admission Body Weight:  (n/a)    · Usual Body Weight:  (n/a)     · Ideal Body Weight: 172 lbs; % Ideal Body Weight 114.2 %   · BMI: 27.4  · BMI Categories: Overweight (BMI 25.0-29. 9)       Nutrition Diagnosis:   · Inadequate oral intake related to decreased appetite as evidenced by poor intake prior to admission and weight loss per pt    Nutrition Interventions:   Food and/or Nutrient Delivery:  Continue Current Diet,Start Oral Nutrition Supplement  Nutrition Education/Counseling:  No recommendation at this time   Coordination of Nutrition Care:  Continue to monitor while inpatient    Goals:  Pt will consume greater than 50% of meals and supplements       Nutrition Monitoring and Evaluation:   Behavioral-Environmental Outcomes:  None Identified   Food/Nutrient Intake Outcomes:  Supplement Intake,Food and Nutrient Intake  Physical Signs/Symptoms Outcomes:  Biochemical Data,GI Status,Hemodynamic Status,Weight,Skin,Fluid Status or Edema     Discharge Planning:     Too soon to determine     Electronically signed by Miguel Shell MS, RD, LD on 4/13/22 at 12:40 PM EDT    Contact: [phone]

## 2022-04-13 NOTE — PROGRESS NOTES
V2.0  [facility] [place]      Name:  Piotr Sanchez /Age/Sex: 1945  (78 y.o. male)   MRN & CSN:  [id number] & [id number] Encounter Date/Time: 2022 10:04 AM EDT    Location:  [id number]-[id number] PCP: Eduardo Wan MD       Hospital Day: 2    Assessment and Plan:   Piotr Sanchez is a 78 y.o. male with pmh of hypertension, hyperlipidemia, prediabetes, hypothyroidism who presents with generalized weakness in the past 1 week. Patient was found UTI with white cell 26. And ANTHONY with creatinine level 1.8. Plan:  Sepsis due to UTI  -Temperature 101 on  evening  -White cell 26 in admission, repeat white cell 22 this morning   -Lactic acid normal  -Blood culture and urine culture is pending  -On antibiotics Rocephin  -Restart gentle hydration with normal saline 100 mL/h for 10 hours    UTI  -Reported frequency urinating in the past couple weeks  -Completed 1 course of oral antibiotics from PCP (not able to tell me the name of antibiotics)  -UA showed small leukocyte and many bacteria, urine culture is pending  -Currently on Rocephin  -Urology consult, appreciate their recommendation    ANTHONY  -Creatinine level 1.8 in admission, no baseline to compare with  -Repeat creatinine 1.5 this morning  -Continue gentle hydration  -Repeat BMP in a.m. Hypertension  -Continue amlodipine 10 mg daily and metoprolol  mg daily  -Hold diuretics    Hyperlipidemia  -Continue statin and fenofibrate    Hypothyroidism  -TSH and T4 normal  -Continue home meds Synthroid 50 MCG    DVT prophylaxis  -Heparin    Dispo: Patient lives alone at home. He seems having impaired memory. Not sure whether he takes medication. PT OT evaluation,  consulted for discharge planning. Diet ADULT DIET;  Regular; Low Fat/Low Chol/High Fiber/MIRNA; No Added Salt (3-4 gm)   DVT Prophylaxis [] Lovenox, [x]  Heparin, [] SCDs, [] Ambulation,  [] Eliquis, [] Xarelto  [] Coumadin   Code Status Full Code   Disposition From: Home  Expected Disposition: Per PT OT  Estimated Date of Discharge: Pending  Patient requires continued admission due to medical condition   Surrogate Decision Maker/ POA      Subjective:     Chief Complaint: Fatigue (feels tired and \"has no energy\", states this has been going on for several weeks.)       Lyle Bass is a 78 y.o. male with history of hypertension, hyperlipidemia, hypothyroidism was admitted to hospital for UTI with sepsis and ANTHONY on April 2. Patient has been treated with antibiotics and IV fluids. Urology consulted. PT OT and  consulted. Review of Systems:    Review of Systems   Constitutional: Positive for fatigue. HENT: Negative. Eyes: Negative. Respiratory: Negative. Cardiovascular: Negative. Gastrointestinal: Negative. Genitourinary: Positive for frequency. Musculoskeletal: Negative. Skin: Negative. Neurological: Negative. Hematological: Negative. Psychiatric/Behavioral: Negative. Objective: Intake/Output Summary (Last 24 hours) at 4/13/2022 1004  Last data filed at 4/13/2022 0753  Gross per 24 hour   Intake 1296.41 ml   Output --   Net 1296.41 ml        Vitals:   Vitals:    04/13/22 0755   BP: 117/65   Pulse: 64   Resp: 17   Temp: 99 °F (37.2 °C)   SpO2: 96%       Physical Exam:     General: NAD  Eyes: EOMI  ENT: neck supple  Cardiovascular: Regular rate. Respiratory: Clear to auscultation  Gastrointestinal: Soft, non tender  Genitourinary: no suprapubic tenderness  Musculoskeletal: No edema  Skin: warm, dry  Neuro: Alert. Psych: Mood appropriate.      Medications:   Medications:    amLODIPine  10 mg Oral Daily    atorvastatin  10 mg Oral Daily    fenofibrate  160 mg Oral Daily    levothyroxine  50 mcg Oral Daily    metoprolol succinate  100 mg Oral Daily    cefTRIAXone (ROCEPHIN) IV  1,000 mg IntraVENous Q24H    sodium chloride flush  5-40 mL IntraVENous BID    heparin (porcine)  5,000 Units SubCUTAneous 3 times per day      Infusions:    sodium chloride 100 mL/hr at 04/13/22 0757    sodium chloride       PRN Meds: sodium chloride flush, 5-40 mL, PRN  sodium chloride, , PRN  ondansetron, 4 mg, Q8H PRN   Or  ondansetron, 4 mg, Q6H PRN  acetaminophen, 650 mg, Q6H PRN   Or  acetaminophen, 650 mg, Q6H PRN  polyethylene glycol, 17 g, Daily PRN        Labs      Recent Results (from the past 24 hour(s))   POCT Glucose    Collection Time: 04/12/22  3:35 PM   Result Value Ref Range    POC Glucose 127 (H) 70 - 99 MG/DL   CBC with Auto Differential    Collection Time: 04/12/22  3:41 PM   Result Value Ref Range    WBC 25.9 (H) 4.0 - 10.5 K/CU MM    RBC 4.42 (L) 4.6 - 6.2 M/CU MM    Hemoglobin 13.6 13.5 - 18.0 GM/DL    Hematocrit 41.6 (L) 42 - 52 %    MCV 94.1 78 - 100 FL    MCH 30.8 27 - 31 PG    MCHC 32.7 32.0 - 36.0 %    RDW 13.9 11.7 - 14.9 %    Platelets 667 384 - 951 K/CU MM    MPV 10.2 7.5 - 11.1 FL    Differential Type AUTOMATED DIFFERENTIAL     Segs Relative 79.9 (H) 36 - 66 %    Lymphocytes % 7.9 (L) 24 - 44 %    Monocytes % 11.2 (H) 0 - 4 %    Eosinophils % 0.1 0 - 3 %    Basophils % 0.2 0 - 1 %    Segs Absolute 20.7 K/CU MM    Lymphocytes Absolute 2.0 K/CU MM    Monocytes Absolute 2.9 K/CU MM    Eosinophils Absolute 0.0 K/CU MM    Basophils Absolute 0.1 K/CU MM    Nucleated RBC % 0.0 %    Total Nucleated RBC 0.0 K/CU MM    Total Immature Neutrophil 0.17 K/CU MM    Immature Neutrophil % 0.7 (H) 0 - 0.43 %   Comprehensive Metabolic Panel    Collection Time: 04/12/22  3:41 PM   Result Value Ref Range    Sodium 132 (L) 135 - 145 MMOL/L    Potassium 3.9 3.5 - 5.1 MMOL/L    Chloride 96 (L) 99 - 110 mMol/L    CO2 22 21 - 32 MMOL/L    BUN 29 (H) 6 - 23 MG/DL    CREATININE 1.8 (H) 0.9 - 1.3 MG/DL    Glucose 128 (H) 70 - 99 MG/DL    Calcium 9.6 8.3 - 10.6 MG/DL    Albumin 3.4 3.4 - 5.0 GM/DL    Total Protein 7.9 6.4 - 8.2 GM/DL    Total Bilirubin 0.6 0.0 - 1.0 MG/DL    ALT 33 10 - 40 U/L    AST 33 15 - 37 IU/L    Alkaline Phosphatase 54 40 - 129 IU/L    GFR Non- 37 (L) >60 mL/min/1.73m2    GFR  45 (L) >60 mL/min/1.73m2    Anion Gap 14 4 - 16   Troponin    Collection Time: 04/12/22  3:41 PM   Result Value Ref Range    Troponin T <0.010 <0.01 NG/ML   Brain Natriuretic Peptide    Collection Time: 04/12/22  3:41 PM   Result Value Ref Range    Pro-.2 (H) <300 PG/ML   TSH    Collection Time: 04/12/22  3:41 PM   Result Value Ref Range    TSH, High Sensitivity 1.220 0.270 - 4.20 uIu/ml   T4, Free    Collection Time: 04/12/22  3:41 PM   Result Value Ref Range    T4 Free 1.24 0.9 - 1.8 NG/DL   Lactic Acid    Collection Time: 04/12/22  3:41 PM   Result Value Ref Range    Lactate 1.4 0.4 - 2.0 mMOL/L   CK    Collection Time: 04/12/22  3:41 PM   Result Value Ref Range    Total  38 - 174 IU/L   COVID-19, Rapid    Collection Time: 04/12/22  5:20 PM    Specimen: Nasopharyngeal   Result Value Ref Range    Source THROAT     SARS-CoV-2, NAAT NOT DETECTED NOT DETECTED   Urinalysis with Microscopic    Collection Time: 04/12/22  6:35 PM   Result Value Ref Range    Color, UA YELLOW YELLOW    Clarity, UA CLOUDY (A) CLEAR    Glucose, Urine NEGATIVE NEGATIVE MG/DL    Bilirubin Urine NEGATIVE NEGATIVE MG/DL    Ketones, Urine NEGATIVE NEGATIVE MG/DL    Specific Gravity, UA 1.025 1.001 - 1.035    Blood, Urine SMALL (A) NEGATIVE    pH, Urine 5.5 5.0 - 8.0    Protein, UA TRACE (A) NEGATIVE MG/DL    Urobilinogen, Urine NORMAL 0.2 - 1.0 MG/DL    Nitrite Urine, Quantitative NEGATIVE NEGATIVE    Leukocyte Esterase, Urine SMALL (A) NEGATIVE    RBC, UA 4 (H) 0 - 3 /HPF    WBC,  (H) 0 - 2 /HPF    Bacteria, UA MANY (A) NEGATIVE /HPF    WBC Clumps, UA MANY /HPF    Squam Epithel, UA 1 /HPF    Mucus, UA RARE (A) NEGATIVE HPF   Ammonia    Collection Time: 04/13/22  1:39 AM   Result Value Ref Range    Ammonia 27 16 - 60 UMOL/L   Basic Metabolic Panel w/ Reflex to MG    Collection Time: 04/13/22  1:39 AM   Result Value Ref Range    Sodium 136 135 - 145 MMOL/L    Potassium 4.1 3.5 - 5.1 MMOL/L    Chloride 101 99 - 110 mMol/L    CO2 22 21 - 32 MMOL/L    Anion Gap 13 4 - 16    BUN 28 (H) 6 - 23 MG/DL    CREATININE 1.5 (H) 0.9 - 1.3 MG/DL    Glucose 127 (H) 70 - 99 MG/DL    Calcium 9.1 8.3 - 10.6 MG/DL    GFR Non- 46 (L) >60 mL/min/1.73m2    GFR  55 (L) >60 mL/min/1.73m2   CBC with Auto Differential    Collection Time: 04/13/22  1:39 AM   Result Value Ref Range    WBC 22.2 (H) 4.0 - 10.5 K/CU MM    RBC 4.09 (L) 4.6 - 6.2 M/CU MM    Hemoglobin 12.7 (L) 13.5 - 18.0 GM/DL    Hematocrit 37.9 (L) 42 - 52 %    MCV 92.7 78 - 100 FL    MCH 31.1 (H) 27 - 31 PG    MCHC 33.5 32.0 - 36.0 %    RDW 13.9 11.7 - 14.9 %    Platelets 907 099 - 116 K/CU MM    MPV 9.6 7.5 - 11.1 FL    Differential Type AUTOMATED DIFFERENTIAL     Segs Relative 79.4 (H) 36 - 66 %    Lymphocytes % 8.7 (L) 24 - 44 %    Monocytes % 10.9 (H) 0 - 4 %    Eosinophils % 0.1 0 - 3 %    Basophils % 0.2 0 - 1 %    Segs Absolute 17.6 K/CU MM    Lymphocytes Absolute 1.9 K/CU MM    Monocytes Absolute 2.4 K/CU MM    Eosinophils Absolute 0.0 K/CU MM    Basophils Absolute 0.0 K/CU MM    Nucleated RBC % 0.0 %    Total Nucleated RBC 0.0 K/CU MM    Total Immature Neutrophil 0.16 K/CU MM    Immature Neutrophil % 0.7 (H) 0 - 0.43 %   Troponin    Collection Time: 04/13/22  1:39 AM   Result Value Ref Range    Troponin T <0.010 <0.01 NG/ML   Creatinine, urine, random    Collection Time: 04/13/22  3:54 AM   Result Value Ref Range    Creatinine, Ur 139.7 MG/DL   Sodium, urine, random    Collection Time: 04/13/22  4:02 AM   Result Value Ref Range    Sodium, Ur 36 35 - 167 MMOL/L   Troponin    Collection Time: 04/13/22  7:10 AM   Result Value Ref Range    Troponin T <0.010 <0.01 NG/ML   Lactic Acid    Collection Time: 04/13/22  8:29 AM   Result Value Ref Range    Lactate 1.0 0.4 - 2.0 mMOL/L        Imaging/Diagnostics Last 24 Hours   CT ABDOMEN PELVIS WO CONTRAST Additional Contrast? None    Result Date: 4/12/2022  EXAMINATION: CT OF THE ABDOMEN AND PELVIS WITHOUT CONTRAST; CT OF THE CHEST WITHOUT CONTRAST 4/12/2022 6:14 pm TECHNIQUE: CT of the abdomen and pelvis was performed without the administration of intravenous contrast. Multiplanar reformatted images are provided for review. Dose modulation, iterative reconstruction, and/or weight based adjustment of the mA/kV was utilized to reduce the radiation dose to as low as reasonably achievable.; CT of the chest was performed without the administration of intravenous contrast. Multiplanar reformatted images are provided for review. Dose modulation, iterative reconstruction, and/or weight based adjustment of the mA/kV was utilized to reduce the radiation dose to as low as reasonably achievable. COMPARISON: None. HISTORY: ORDERING SYSTEM PROVIDED HISTORY: white count 25,000, fatigue TECHNOLOGIST PROVIDED HISTORY: Reason for exam:-> white count 25 k, fatigue Additional Contrast?-> none Reason for Exam: white count 25 k, fatigue FINDINGS: Mediastinum: Cardiac structures and great vessels appear unremarkable with exception of calcific atherosclerotic disease. No pericardial effusion. Left hilar calcification typical of sequela from old granulomatous disease. Posterior mediastinal structures appear unremarkable. No mediastinal or hilar adenopathy. Lungs/pleura: Densely calcified nodules lateral mid left and right lung, typical of sequela from old granulomatous disease. Well-expanded and otherwise clear, without soft tissue or ground-glass pulmonary nodule. No pleural effusion or pneumothorax. No inspissated secretions or endobronchial lesion evident. Organs: Mild hepatic morphologic features typical of cirrhosis. The liver appears fatty and diffusely mildly heterogeneous. No discrete mass lesion evident. The gallbladder, spleen, pancreas, adrenal glands and kidneys appear unremarkable.  GI/Bowel: No diffuse or focal bowel wall thickening evident. No inflammatory changes evident. No obstruction is seen. The normal appendix is seen right lower quadrant. Pelvis: Prostate gland and seminal vesicles appear unremarkable. Urinary bladder is partially filled, diffusely mild thick walled appearance. Mild pericystic inflammatory changes. No adenopathy or free fluid. Peritoneum/Retroperitoneum: Mild calcific atherosclerosis of the aorta, otherwise unremarkable appearance of the aorta with no aneurysm. Unremarkable appearance of the IVC. No adenopathy or fluid. Bones/Soft Tissues: No acute superficial soft tissue or osseous structure abnormality evident. 1.  CT CHEST: No acute abnormality. 2. Mild calcific atherosclerosis aorta and coronary arteries. 3. Otherwise unremarkable chest CT. No finding to suggest etiology for fatigue and elevated white blood cell count. 4. CT ABDOMEN/PELVIS: Acute cystitis suspected given the findings; correlate with urinalysis. 5.  No findings to suggest acute appendicitis; no ureter calculus or hydronephrosis. 6. Mild hepatic morphologic features suggestive of cirrhosis, though nonspecific. 7. Hepatic steatosis. 8.  Calcific atherosclerotic disease aorta. CT CHEST WO CONTRAST    Result Date: 4/12/2022  EXAMINATION: CT OF THE ABDOMEN AND PELVIS WITHOUT CONTRAST; CT OF THE CHEST WITHOUT CONTRAST 4/12/2022 6:14 pm TECHNIQUE: CT of the abdomen and pelvis was performed without the administration of intravenous contrast. Multiplanar reformatted images are provided for review. Dose modulation, iterative reconstruction, and/or weight based adjustment of the mA/kV was utilized to reduce the radiation dose to as low as reasonably achievable.; CT of the chest was performed without the administration of intravenous contrast. Multiplanar reformatted images are provided for review.  Dose modulation, iterative reconstruction, and/or weight based adjustment of the mA/kV was utilized to reduce the radiation dose to as low as reasonably achievable. COMPARISON: None. HISTORY: ORDERING SYSTEM PROVIDED HISTORY: white count 25,000, fatigue TECHNOLOGIST PROVIDED HISTORY: Reason for exam:-> white count 25 k, fatigue Additional Contrast?-> none Reason for Exam: white count 25 k, fatigue FINDINGS: Mediastinum: Cardiac structures and great vessels appear unremarkable with exception of calcific atherosclerotic disease. No pericardial effusion. Left hilar calcification typical of sequela from old granulomatous disease. Posterior mediastinal structures appear unremarkable. No mediastinal or hilar adenopathy. Lungs/pleura: Densely calcified nodules lateral mid left and right lung, typical of sequela from old granulomatous disease. Well-expanded and otherwise clear, without soft tissue or ground-glass pulmonary nodule. No pleural effusion or pneumothorax. No inspissated secretions or endobronchial lesion evident. Organs: Mild hepatic morphologic features typical of cirrhosis. The liver appears fatty and diffusely mildly heterogeneous. No discrete mass lesion evident. The gallbladder, spleen, pancreas, adrenal glands and kidneys appear unremarkable. GI/Bowel: No diffuse or focal bowel wall thickening evident. No inflammatory changes evident. No obstruction is seen. The normal appendix is seen right lower quadrant. Pelvis: Prostate gland and seminal vesicles appear unremarkable. Urinary bladder is partially filled, diffusely mild thick walled appearance. Mild pericystic inflammatory changes. No adenopathy or free fluid. Peritoneum/Retroperitoneum: Mild calcific atherosclerosis of the aorta, otherwise unremarkable appearance of the aorta with no aneurysm. Unremarkable appearance of the IVC. No adenopathy or fluid. Bones/Soft Tissues: No acute superficial soft tissue or osseous structure abnormality evident. 1.  CT CHEST: No acute abnormality. 2. Mild calcific atherosclerosis aorta and coronary arteries. 3. Otherwise unremarkable chest CT.   No finding to suggest etiology for fatigue and elevated white blood cell count. 4. CT ABDOMEN/PELVIS: Acute cystitis suspected given the findings; correlate with urinalysis. 5.  No findings to suggest acute appendicitis; no ureter calculus or hydronephrosis. 6. Mild hepatic morphologic features suggestive of cirrhosis, though nonspecific. 7. Hepatic steatosis. 8.  Calcific atherosclerotic disease aorta. XR CHEST PORTABLE    Result Date: 4/12/2022  EXAMINATION: ONE XRAY VIEW OF THE CHEST 4/12/2022 4:42 pm COMPARISON: CT chest, abdomen, and pelvis same day HISTORY: ORDERING SYSTEM PROVIDED HISTORY: fatigued, easily SOB with exertion TECHNOLOGIST PROVIDED HISTORY: Reason for exam:->fatigued, easily SOB with exertion Reason for Exam: fatigued, easily SOB with exertion FINDINGS: The lungs are without acute focal process. There is no effusion or pneumothorax. The cardiomediastinal silhouette is without acute process. The osseous structures are without acute process. No acute process.        Electronically signed by Kaylene Chester CNP on 4/13/2022 at 10:04 AM

## 2022-04-13 NOTE — CARE COORDINATION
Reviewed chart and spoke with pt about discharge needs/plans. Pt lives alone, PTA he was totally independent. Daughter will assist pt as needed with transportation. Pt has a PCP and insurance that covers medications. Denies any needs at this time. CM available if any needs arise.

## 2022-04-13 NOTE — CONSULTS
[facility] [address]   Consult Note  [facility] 1 2 3 4 5    Date: 2022   Patient: Noah Godwin   : 1945   DOA: 2022   MRN: [id number]   ROOM#: [id number]     Reason for Consult: UTI, sepsis  Requesting Physician: Eliseo Gregorio CNP  Collaborating Urologist on Call at time of admission: Dr. Christopher Eth: Fatigue    History Obtained From:  patient, electronic medical record    HISTORY OF PRESENT ILLNESS:                The patient is a 78 y.o. male with significant past medical history of HLD and HTN who presented with fatigue x1 month with associated dysuria and feelings of incomplete bladder emptying. Pt was recently started on a 10 day course of oral abx for a UTI. Work-up in the ED revealed ANTHONY (Cr 1.8) and likely UTI with leukocytosis (25.9). Pt was admitted and started on IV Rocephin. His temp overnight reached 101. 3. He is currently feeling much improved with no issues voiding. Denies h/o UTI's. Endorses h/o BPH, but is not on any medications and typically voids well with no LUTS. ED Provider's HPI 22: Noah Godwin is a 78 y.o. male that presents to the emergency department stating he feels tired and \"has no energy for the last month\". States he gets easily short of breath with exertion, no chest pain, no Fevers or chills. States recent UTI on abx from PCP. He states those dysuria symptoms are better. He denies any diaphoresis or syncope. Past Medical History:        Diagnosis Date    Hyperlipidemia     Hypertension      Past Surgical History:    History reviewed. No pertinent surgical history.   Current Medications:   Current Facility-Administered Medications: 0.9 % sodium chloride infusion, , IntraVENous, Continuous  amLODIPine (NORVASC) tablet 10 mg, 10 mg, Oral, Daily  atorvastatin (LIPITOR) tablet 10 mg, 10 mg, Oral, Daily  fenofibrate (TRIGLIDE) tablet 160 mg, 160 mg, Oral, Daily  levothyroxine (SYNTHROID) tablet 50 mcg, 50 mcg, Oral, Daily  metoprolol succinate (TOPROL XL) extended release tablet 100 mg, 100 mg, Oral, Daily **OR** [DISCONTINUED] hydroCHLOROthiazide (HYDRODIURIL) tablet 6.25 mg, 6.25 mg, Oral, Daily  cefTRIAXone (ROCEPHIN) 1000 mg IVPB in 50 mL D5W minibag, 1,000 mg, IntraVENous, Q24H  sodium chloride flush 0.9 % injection 5-40 mL, 5-40 mL, IntraVENous, BID  sodium chloride flush 0.9 % injection 5-40 mL, 5-40 mL, IntraVENous, PRN  0.9 % sodium chloride infusion, , IntraVENous, PRN  ondansetron (ZOFRAN-ODT) disintegrating tablet 4 mg, 4 mg, Oral, Q8H PRN **OR** ondansetron (ZOFRAN) injection 4 mg, 4 mg, IntraVENous, Q6H PRN  acetaminophen (TYLENOL) tablet 650 mg, 650 mg, Oral, Q6H PRN **OR** acetaminophen (TYLENOL) suppository 650 mg, 650 mg, Rectal, Q6H PRN  polyethylene glycol (GLYCOLAX) packet 17 g, 17 g, Oral, Daily PRN  heparin (porcine) injection 5,000 Units, 5,000 Units, SubCUTAneous, 3 times per day    Allergies:  Patient has no known allergies. Social History:   TOBACCO:   reports that he has never smoked. He has never used smokeless tobacco.  ETOH:   reports no history of alcohol use. DRUGS:   reports no history of drug use. Family History:       Problem Relation Age of Onset    Tuberculosis Mother     Coronary Art Dis Father     Hypertension Father        REVIEW OF SYSTEMS:     CONSTITUTIONAL:  positive for  fatigue  RESPIRATORY:  negative  CARDIOVASCULAR:  negative  GASTROINTESTINAL:  negative  GENITOURINARY:  negative    PHYSICAL EXAM:      VITALS:  /65   Pulse 64   Temp 99 °F (37.2 °C) (Oral)   Resp 17   Ht 5' 11\" (1.803 m)   Wt 196 lb 6.9 oz (89.1 kg)   SpO2 96%   BMI 27.40 kg/m²      TEMPERATURE:  Current - Temp: 99 °F (37.2 °C);  Max - Temp  Av.5 °F (37.5 °C)  Min: 98.2 °F (36.8 °C)  Max: 101.3 °F (38.5 °C)  24HR BLOOD PRESSURE RANGE:  Systolic (08VRS), HPX:558 , Min:109 , RQP:088   ; Diastolic (27EBS), GWW:53, Min:62, Max:82    8HR INTAKE OUTPUT:  In: 1296.4 [I.V.:1296.4]  Out: - URINARY CATHETER OUTPUT (Mo):     DRAIN/TUBE OUTPUT:        General appearance: alert, appears stated age, cooperative, no distress and mildly obese  Head: Normocephalic, without obvious abnormality, atraumatic  Back: No CVA tenderness  Abdomen: Soft, non-tender, non-distended    DATA:    WBC:    Lab Results   Component Value Date    WBC 22.2 04/13/2022     Hemoglobin/Hematocrit:    Lab Results   Component Value Date    HGB 12.7 04/13/2022    HCT 37.9 04/13/2022     BMP:    Lab Results   Component Value Date     04/13/2022    K 4.1 04/13/2022     04/13/2022    CO2 22 04/13/2022    BUN 28 04/13/2022    LABALBU 3.4 04/12/2022    CREATININE 1.5 04/13/2022    CALCIUM 9.1 04/13/2022    GFRAA 55 04/13/2022    LABGLOM 46 04/13/2022     Blood Culture: pending  Urine Culture: pending    Imaging:  CT ABDOMEN PELVIS WO CONTRAST Additional Contrast? None    Result Date: 4/12/2022  EXAMINATION: CT OF THE ABDOMEN AND PELVIS WITHOUT CONTRAST; CT OF THE CHEST WITHOUT CONTRAST 4/12/2022 6:14 pm TECHNIQUE: CT of the abdomen and pelvis was performed without the administration of intravenous contrast. Multiplanar reformatted images are provided for review. Dose modulation, iterative reconstruction, and/or weight based adjustment of the mA/kV was utilized to reduce the radiation dose to as low as reasonably achievable.; CT of the chest was performed without the administration of intravenous contrast. Multiplanar reformatted images are provided for review. Dose modulation, iterative reconstruction, and/or weight based adjustment of the mA/kV was utilized to reduce the radiation dose to as low as reasonably achievable. COMPARISON: None.  HISTORY: ORDERING SYSTEM PROVIDED HISTORY: white count 25,000, fatigue TECHNOLOGIST PROVIDED HISTORY: Reason for exam:-> white count 25 k, fatigue Additional Contrast?-> none Reason for Exam: white count 25 k, fatigue FINDINGS: Mediastinum: Cardiac structures and great vessels appear unremarkable with exception of calcific atherosclerotic disease. No pericardial effusion. Left hilar calcification typical of sequela from old granulomatous disease. Posterior mediastinal structures appear unremarkable. No mediastinal or hilar adenopathy. Lungs/pleura: Densely calcified nodules lateral mid left and right lung, typical of sequela from old granulomatous disease. Well-expanded and otherwise clear, without soft tissue or ground-glass pulmonary nodule. No pleural effusion or pneumothorax. No inspissated secretions or endobronchial lesion evident. Organs: Mild hepatic morphologic features typical of cirrhosis. The liver appears fatty and diffusely mildly heterogeneous. No discrete mass lesion evident. The gallbladder, spleen, pancreas, adrenal glands and kidneys appear unremarkable. GI/Bowel: No diffuse or focal bowel wall thickening evident. No inflammatory changes evident. No obstruction is seen. The normal appendix is seen right lower quadrant. Pelvis: Prostate gland and seminal vesicles appear unremarkable. Urinary bladder is partially filled, diffusely mild thick walled appearance. Mild pericystic inflammatory changes. No adenopathy or free fluid. Peritoneum/Retroperitoneum: Mild calcific atherosclerosis of the aorta, otherwise unremarkable appearance of the aorta with no aneurysm. Unremarkable appearance of the IVC. No adenopathy or fluid. Bones/Soft Tissues: No acute superficial soft tissue or osseous structure abnormality evident. 1.  CT CHEST: No acute abnormality. 2. Mild calcific atherosclerosis aorta and coronary arteries. 3. Otherwise unremarkable chest CT. No finding to suggest etiology for fatigue and elevated white blood cell count. 4. CT ABDOMEN/PELVIS: Acute cystitis suspected given the findings; correlate with urinalysis. 5.  No findings to suggest acute appendicitis; no ureter calculus or hydronephrosis.  6. Mild hepatic morphologic features suggestive of cirrhosis, though nonspecific. 7. Hepatic steatosis. 8.  Calcific atherosclerotic disease aorta. CT CHEST WO CONTRAST    Result Date: 4/12/2022  EXAMINATION: CT OF THE ABDOMEN AND PELVIS WITHOUT CONTRAST; CT OF THE CHEST WITHOUT CONTRAST 4/12/2022 6:14 pm TECHNIQUE: CT of the abdomen and pelvis was performed without the administration of intravenous contrast. Multiplanar reformatted images are provided for review. Dose modulation, iterative reconstruction, and/or weight based adjustment of the mA/kV was utilized to reduce the radiation dose to as low as reasonably achievable.; CT of the chest was performed without the administration of intravenous contrast. Multiplanar reformatted images are provided for review. Dose modulation, iterative reconstruction, and/or weight based adjustment of the mA/kV was utilized to reduce the radiation dose to as low as reasonably achievable. COMPARISON: None. HISTORY: ORDERING SYSTEM PROVIDED HISTORY: white count 25,000, fatigue TECHNOLOGIST PROVIDED HISTORY: Reason for exam:-> white count 25 k, fatigue Additional Contrast?-> none Reason for Exam: white count 25 k, fatigue FINDINGS: Mediastinum: Cardiac structures and great vessels appear unremarkable with exception of calcific atherosclerotic disease. No pericardial effusion. Left hilar calcification typical of sequela from old granulomatous disease. Posterior mediastinal structures appear unremarkable. No mediastinal or hilar adenopathy. Lungs/pleura: Densely calcified nodules lateral mid left and right lung, typical of sequela from old granulomatous disease. Well-expanded and otherwise clear, without soft tissue or ground-glass pulmonary nodule. No pleural effusion or pneumothorax. No inspissated secretions or endobronchial lesion evident. Organs: Mild hepatic morphologic features typical of cirrhosis. The liver appears fatty and diffusely mildly heterogeneous. No discrete mass lesion evident.   The gallbladder, spleen, pancreas, adrenal glands and kidneys appear unremarkable. GI/Bowel: No diffuse or focal bowel wall thickening evident. No inflammatory changes evident. No obstruction is seen. The normal appendix is seen right lower quadrant. Pelvis: Prostate gland and seminal vesicles appear unremarkable. Urinary bladder is partially filled, diffusely mild thick walled appearance. Mild pericystic inflammatory changes. No adenopathy or free fluid. Peritoneum/Retroperitoneum: Mild calcific atherosclerosis of the aorta, otherwise unremarkable appearance of the aorta with no aneurysm. Unremarkable appearance of the IVC. No adenopathy or fluid. Bones/Soft Tissues: No acute superficial soft tissue or osseous structure abnormality evident. 1.  CT CHEST: No acute abnormality. 2. Mild calcific atherosclerosis aorta and coronary arteries. 3. Otherwise unremarkable chest CT. No finding to suggest etiology for fatigue and elevated white blood cell count. 4. CT ABDOMEN/PELVIS: Acute cystitis suspected given the findings; correlate with urinalysis. 5.  No findings to suggest acute appendicitis; no ureter calculus or hydronephrosis. 6. Mild hepatic morphologic features suggestive of cirrhosis, though nonspecific. 7. Hepatic steatosis. 8.  Calcific atherosclerotic disease aorta. XR CHEST PORTABLE    Result Date: 4/12/2022  EXAMINATION: ONE XRAY VIEW OF THE CHEST 4/12/2022 4:42 pm COMPARISON: CT chest, abdomen, and pelvis same day HISTORY: ORDERING SYSTEM PROVIDED HISTORY: fatigued, easily SOB with exertion TECHNOLOGIST PROVIDED HISTORY: Reason for exam:->fatigued, easily SOB with exertion Reason for Exam: fatigued, easily SOB with exertion FINDINGS: The lungs are without acute focal process. There is no effusion or pneumothorax. The cardiomediastinal silhouette is without acute process. The osseous structures are without acute process. No acute process.      Assessment & Plan:      John Carter is a 78y.o. year old male admitted 4/12/2022 for ANTHONY, UTI. 1) Complicated UTI: urine & blood cultures pending   CT ap 4/12/22 shows unremarkable kidneys, prostate gland and seminal vesicles. Urinary bladder is partially filled, diffusely mild thick walled appearance. WBC 22.2; T-max 101.3 overnight, currently afebrile   Obtain a PVR to ensure proper bladder emptying. On IV Rocephin; adjust as necessary per urine c&s  2) ANTHONY: Cr 1.5,. improving    Will follow. Patient seen and examined, chart reviewed.      Electronically signed by Jessica Villanueva PA-C on 4/13/2022 at 8:25 AM

## 2022-04-14 LAB
ANION GAP SERPL CALCULATED.3IONS-SCNC: 11 MMOL/L (ref 4–16)
BASOPHILS ABSOLUTE: 0 K/CU MM
BASOPHILS RELATIVE PERCENT: 0.3 % (ref 0–1)
BUN BLDV-MCNC: 20 MG/DL (ref 6–23)
CALCIUM SERPL-MCNC: 8.6 MG/DL (ref 8.3–10.6)
CHLORIDE BLD-SCNC: 104 MMOL/L (ref 99–110)
CO2: 22 MMOL/L (ref 21–32)
CREAT SERPL-MCNC: 1.1 MG/DL (ref 0.9–1.3)
DIFFERENTIAL TYPE: ABNORMAL
EOSINOPHILS ABSOLUTE: 0.1 K/CU MM
EOSINOPHILS RELATIVE PERCENT: 0.9 % (ref 0–3)
GFR AFRICAN AMERICAN: >60 ML/MIN/1.73M2
GFR NON-AFRICAN AMERICAN: >60 ML/MIN/1.73M2
GLUCOSE BLD-MCNC: 129 MG/DL (ref 70–99)
HCT VFR BLD CALC: 37.8 % (ref 42–52)
HEMOGLOBIN: 12.4 GM/DL (ref 13.5–18)
IMMATURE NEUTROPHIL %: 0.8 % (ref 0–0.43)
LYMPHOCYTES ABSOLUTE: 1.3 K/CU MM
LYMPHOCYTES RELATIVE PERCENT: 9 % (ref 24–44)
MCH RBC QN AUTO: 30.8 PG (ref 27–31)
MCHC RBC AUTO-ENTMCNC: 32.8 % (ref 32–36)
MCV RBC AUTO: 94 FL (ref 78–100)
MONOCYTES ABSOLUTE: 1.9 K/CU MM
MONOCYTES RELATIVE PERCENT: 12.7 % (ref 0–4)
NUCLEATED RBC %: 0 %
PDW BLD-RTO: 13.9 % (ref 11.7–14.9)
PLATELET # BLD: 335 K/CU MM (ref 140–440)
PMV BLD AUTO: 9.9 FL (ref 7.5–11.1)
POTASSIUM SERPL-SCNC: 3.8 MMOL/L (ref 3.5–5.1)
RBC # BLD: 4.02 M/CU MM (ref 4.6–6.2)
SEGMENTED NEUTROPHILS ABSOLUTE COUNT: 11.1 K/CU MM
SEGMENTED NEUTROPHILS RELATIVE PERCENT: 76.3 % (ref 36–66)
SODIUM BLD-SCNC: 137 MMOL/L (ref 135–145)
TOTAL IMMATURE NEUTOROPHIL: 0.11 K/CU MM
TOTAL NUCLEATED RBC: 0 K/CU MM
WBC # BLD: 14.5 K/CU MM (ref 4–10.5)

## 2022-04-14 PROCEDURE — 6360000002 HC RX W HCPCS: Performed by: STUDENT IN AN ORGANIZED HEALTH CARE EDUCATION/TRAINING PROGRAM

## 2022-04-14 PROCEDURE — 97530 THERAPEUTIC ACTIVITIES: CPT

## 2022-04-14 PROCEDURE — 96366 THER/PROPH/DIAG IV INF ADDON: CPT

## 2022-04-14 PROCEDURE — 6370000000 HC RX 637 (ALT 250 FOR IP): Performed by: STUDENT IN AN ORGANIZED HEALTH CARE EDUCATION/TRAINING PROGRAM

## 2022-04-14 PROCEDURE — 96372 THER/PROPH/DIAG INJ SC/IM: CPT

## 2022-04-14 PROCEDURE — 36415 COLL VENOUS BLD VENIPUNCTURE: CPT

## 2022-04-14 PROCEDURE — 97116 GAIT TRAINING THERAPY: CPT

## 2022-04-14 PROCEDURE — 94761 N-INVAS EAR/PLS OXIMETRY MLT: CPT

## 2022-04-14 PROCEDURE — 2580000003 HC RX 258: Performed by: STUDENT IN AN ORGANIZED HEALTH CARE EDUCATION/TRAINING PROGRAM

## 2022-04-14 PROCEDURE — 1200000000 HC SEMI PRIVATE

## 2022-04-14 PROCEDURE — 85025 COMPLETE CBC W/AUTO DIFF WBC: CPT

## 2022-04-14 PROCEDURE — 80048 BASIC METABOLIC PNL TOTAL CA: CPT

## 2022-04-14 RX ADMIN — ATORVASTATIN CALCIUM 10 MG: 10 TABLET, FILM COATED ORAL at 21:17

## 2022-04-14 RX ADMIN — METOPROLOL SUCCINATE 100 MG: 50 TABLET, EXTENDED RELEASE ORAL at 10:06

## 2022-04-14 RX ADMIN — CEFTRIAXONE 1000 MG: 1 INJECTION, POWDER, FOR SOLUTION INTRAMUSCULAR; INTRAVENOUS at 21:16

## 2022-04-14 RX ADMIN — FENOFIBRATE 160 MG: 160 TABLET ORAL at 10:06

## 2022-04-14 RX ADMIN — SODIUM CHLORIDE, PRESERVATIVE FREE 10 ML: 5 INJECTION INTRAVENOUS at 10:07

## 2022-04-14 RX ADMIN — HEPARIN SODIUM 5000 UNITS: 5000 INJECTION INTRAVENOUS; SUBCUTANEOUS at 06:47

## 2022-04-14 RX ADMIN — ACETAMINOPHEN 650 MG: 325 TABLET ORAL at 21:22

## 2022-04-14 RX ADMIN — HEPARIN SODIUM 5000 UNITS: 5000 INJECTION INTRAVENOUS; SUBCUTANEOUS at 16:20

## 2022-04-14 RX ADMIN — HEPARIN SODIUM 5000 UNITS: 5000 INJECTION INTRAVENOUS; SUBCUTANEOUS at 21:17

## 2022-04-14 RX ADMIN — SODIUM CHLORIDE, PRESERVATIVE FREE 10 ML: 5 INJECTION INTRAVENOUS at 21:24

## 2022-04-14 RX ADMIN — AMLODIPINE BESYLATE 10 MG: 5 TABLET ORAL at 10:06

## 2022-04-14 RX ADMIN — LEVOTHYROXINE SODIUM 50 MCG: 25 TABLET ORAL at 06:47

## 2022-04-14 ASSESSMENT — ENCOUNTER SYMPTOMS
RESPIRATORY NEGATIVE: 1
GASTROINTESTINAL NEGATIVE: 1
EYES NEGATIVE: 1
ALLERGIC/IMMUNOLOGIC NEGATIVE: 1

## 2022-04-14 ASSESSMENT — PAIN SCALES - GENERAL
PAINLEVEL_OUTOF10: 0
PAINLEVEL_OUTOF10: 0

## 2022-04-14 NOTE — PLAN OF CARE
Problem: Sensory:  Goal: General experience of comfort will improve  Description: General experience of comfort will improve  4/14/2022 0429 by Ellyn Aj RN  Outcome: Met This Shift  4/14/2022 0429 by Ellyn Aj RN  Outcome: Ongoing

## 2022-04-14 NOTE — PLAN OF CARE
Problem: Sensory:  Goal: General experience of comfort will improve  Description: General experience of comfort will improve  4/14/2022 1053 by Ayse Can RN  Outcome: Ongoing  4/14/2022 0429 by Jairo Beckett RN  Outcome: Met This Shift  4/14/2022 0429 by Jairo Beckett RN  Outcome: Ongoing

## 2022-04-14 NOTE — CARE COORDINATION
Reviewed chart and therapy recommending home with 24/7 help and HC. Discussed with pt and he has several family members who will be able to assist him at home. He is open to any HC if insurance will cover. PS to Liseth Jimenez with CM.

## 2022-04-14 NOTE — PROGRESS NOTES
[facility] [address]   Progress Note  [facility] 0 1 2      Date: 2022   Patient: John Carter   : 1945   DOA: 2022   MRN: [id number]   ROOM#: [id number]     Admit Date: 2022     Collaborating Urologist on Call at time of admission: Dr. Naheed Skinner  CC: Fatigue  Reason for Consult: UTI, sepsis    Subjective:     Pain: mild, no nausea and no vomiting,   Bowel Movement/Flatus:   Yes  Voiding:  Easily w mild dysuria    Pt resting in bed, states he is feeling better today.     Objective:    Vitals:    /66   Pulse 69   Temp 98.2 °F (36.8 °C) (Oral)   Resp 18   Ht 5' 10.98\" (1.803 m)   Wt 168 lb 9.6 oz (76.5 kg)   SpO2 95%   BMI 23.53 kg/m²    Temp  Av.8 °F (37.1 °C)  Min: 98 °F (36.7 °C)  Max: 100.2 °F (37.9 °C)     Intake/Output Summary (Last 24 hours) at 2022 0959  Last data filed at 2022 0630  Gross per 24 hour   Intake 2109.46 ml   Output 859 ml   Net 1250.46 ml     Physical Exam:   General appearance: alert, appears stated age, cooperative, no distress and mildly obese  Head: Normocephalic, without obvious abnormality, atraumatic  Back: No CVA tenderness  Abdomen: Soft, non-tender, non-distended    Labs:   WBC:    Lab Results   Component Value Date    WBC 22.2 2022      Hemoglobin/Hematocrit:    Lab Results   Component Value Date    HGB 12.7 2022    HCT 37.9 2022      BMP:   Lab Results   Component Value Date     2022    K 4.1 2022     2022    CO2 22 2022    BUN 28 2022    LABALBU 3.4 2022    CREATININE 1.5 2022    CALCIUM 9.1 2022    GFRAA 55 2022    LABGLOM 46 2022     Blood Culture: NGTD  Urine Culture: pending    Imaging:   CT ABDOMEN PELVIS WO CONTRAST Additional Contrast? None    Result Date: 2022  EXAMINATION: CT OF THE ABDOMEN AND PELVIS WITHOUT CONTRAST; CT OF THE CHEST WITHOUT CONTRAST 2022 6:14 pm TECHNIQUE: CT of the abdomen and pelvis was performed without the administration of intravenous contrast. Multiplanar reformatted images are provided for review. Dose modulation, iterative reconstruction, and/or weight based adjustment of the mA/kV was utilized to reduce the radiation dose to as low as reasonably achievable.; CT of the chest was performed without the administration of intravenous contrast. Multiplanar reformatted images are provided for review. Dose modulation, iterative reconstruction, and/or weight based adjustment of the mA/kV was utilized to reduce the radiation dose to as low as reasonably achievable. COMPARISON: None. HISTORY: ORDERING SYSTEM PROVIDED HISTORY: white count 25,000, fatigue TECHNOLOGIST PROVIDED HISTORY: Reason for exam:-> white count 25 k, fatigue Additional Contrast?-> none Reason for Exam: white count 25 k, fatigue FINDINGS: Mediastinum: Cardiac structures and great vessels appear unremarkable with exception of calcific atherosclerotic disease. No pericardial effusion. Left hilar calcification typical of sequela from old granulomatous disease. Posterior mediastinal structures appear unremarkable. No mediastinal or hilar adenopathy. Lungs/pleura: Densely calcified nodules lateral mid left and right lung, typical of sequela from old granulomatous disease. Well-expanded and otherwise clear, without soft tissue or ground-glass pulmonary nodule. No pleural effusion or pneumothorax. No inspissated secretions or endobronchial lesion evident. Organs: Mild hepatic morphologic features typical of cirrhosis. The liver appears fatty and diffusely mildly heterogeneous. No discrete mass lesion evident. The gallbladder, spleen, pancreas, adrenal glands and kidneys appear unremarkable. GI/Bowel: No diffuse or focal bowel wall thickening evident. No inflammatory changes evident. No obstruction is seen. The normal appendix is seen right lower quadrant. Pelvis: Prostate gland and seminal vesicles appear unremarkable.   Urinary bladder is partially filled, diffusely mild thick walled appearance. Mild pericystic inflammatory changes. No adenopathy or free fluid. Peritoneum/Retroperitoneum: Mild calcific atherosclerosis of the aorta, otherwise unremarkable appearance of the aorta with no aneurysm. Unremarkable appearance of the IVC. No adenopathy or fluid. Bones/Soft Tissues: No acute superficial soft tissue or osseous structure abnormality evident. 1.  CT CHEST: No acute abnormality. 2. Mild calcific atherosclerosis aorta and coronary arteries. 3. Otherwise unremarkable chest CT. No finding to suggest etiology for fatigue and elevated white blood cell count. 4. CT ABDOMEN/PELVIS: Acute cystitis suspected given the findings; correlate with urinalysis. 5.  No findings to suggest acute appendicitis; no ureter calculus or hydronephrosis. 6. Mild hepatic morphologic features suggestive of cirrhosis, though nonspecific. 7. Hepatic steatosis. 8.  Calcific atherosclerotic disease aorta. CT CHEST WO CONTRAST    Result Date: 4/12/2022  EXAMINATION: CT OF THE ABDOMEN AND PELVIS WITHOUT CONTRAST; CT OF THE CHEST WITHOUT CONTRAST 4/12/2022 6:14 pm TECHNIQUE: CT of the abdomen and pelvis was performed without the administration of intravenous contrast. Multiplanar reformatted images are provided for review. Dose modulation, iterative reconstruction, and/or weight based adjustment of the mA/kV was utilized to reduce the radiation dose to as low as reasonably achievable.; CT of the chest was performed without the administration of intravenous contrast. Multiplanar reformatted images are provided for review. Dose modulation, iterative reconstruction, and/or weight based adjustment of the mA/kV was utilized to reduce the radiation dose to as low as reasonably achievable. COMPARISON: None.  HISTORY: ORDERING SYSTEM PROVIDED HISTORY: white count 25,000, fatigue TECHNOLOGIST PROVIDED HISTORY: Reason for exam:-> white count 25 k, fatigue Additional Contrast?-> none Reason for Exam: white count 25 k, fatigue FINDINGS: Mediastinum: Cardiac structures and great vessels appear unremarkable with exception of calcific atherosclerotic disease. No pericardial effusion. Left hilar calcification typical of sequela from old granulomatous disease. Posterior mediastinal structures appear unremarkable. No mediastinal or hilar adenopathy. Lungs/pleura: Densely calcified nodules lateral mid left and right lung, typical of sequela from old granulomatous disease. Well-expanded and otherwise clear, without soft tissue or ground-glass pulmonary nodule. No pleural effusion or pneumothorax. No inspissated secretions or endobronchial lesion evident. Organs: Mild hepatic morphologic features typical of cirrhosis. The liver appears fatty and diffusely mildly heterogeneous. No discrete mass lesion evident. The gallbladder, spleen, pancreas, adrenal glands and kidneys appear unremarkable. GI/Bowel: No diffuse or focal bowel wall thickening evident. No inflammatory changes evident. No obstruction is seen. The normal appendix is seen right lower quadrant. Pelvis: Prostate gland and seminal vesicles appear unremarkable. Urinary bladder is partially filled, diffusely mild thick walled appearance. Mild pericystic inflammatory changes. No adenopathy or free fluid. Peritoneum/Retroperitoneum: Mild calcific atherosclerosis of the aorta, otherwise unremarkable appearance of the aorta with no aneurysm. Unremarkable appearance of the IVC. No adenopathy or fluid. Bones/Soft Tissues: No acute superficial soft tissue or osseous structure abnormality evident. 1.  CT CHEST: No acute abnormality. 2. Mild calcific atherosclerosis aorta and coronary arteries. 3. Otherwise unremarkable chest CT. No finding to suggest etiology for fatigue and elevated white blood cell count. 4. CT ABDOMEN/PELVIS: Acute cystitis suspected given the findings; correlate with urinalysis.  5.  No findings to suggest acute appendicitis; no ureter calculus or hydronephrosis. 6. Mild hepatic morphologic features suggestive of cirrhosis, though nonspecific. 7. Hepatic steatosis. 8.  Calcific atherosclerotic disease aorta. XR CHEST PORTABLE    Result Date: 4/12/2022  EXAMINATION: ONE XRAY VIEW OF THE CHEST 4/12/2022 4:42 pm COMPARISON: CT chest, abdomen, and pelvis same day HISTORY: ORDERING SYSTEM PROVIDED HISTORY: fatigued, easily SOB with exertion TECHNOLOGIST PROVIDED HISTORY: Reason for exam:->fatigued, easily SOB with exertion Reason for Exam: fatigued, easily SOB with exertion FINDINGS: The lungs are without acute focal process. There is no effusion or pneumothorax. The cardiomediastinal silhouette is without acute process. The osseous structures are without acute process. No acute process. Assessment & Plan:      Herminio Galvan is a 78y.o. year old male admitted 4/12/2022 for ANTHONY, UTI.     1) Complicated UTI: urine & blood cultures pending              CT ap 4/12/22 shows unremarkable kidneys, prostate gland and seminal vesicles. Urinary bladder is partially filled, diffusely mild thick walled appearance. WBC 14.5, improving; T-max 100.2 overnight, currently afebrile              On IV Rocephin; adjust as necessary per urine c&s  2) ANTHONY: Cr 1.1, improved from 1.8 4/12/22     Will follow. Patient seen and examined, chart reviewed.      Electronically signed by Cheryl Cochran PA-C on 4/14/2022 at 9:59 AM

## 2022-04-14 NOTE — PROGRESS NOTES
Occupational Therapy      Per therapy triage process, the OT referral has been discharged. Per PT eval pt presents w/ no skilled acute needs and can discharge home w/ outpatient and does not present w/ any skilled therapy needs. Please re-order in future if pt requires skilled therapy services.     Kaylene Dalton OTR/L [id number]  11:12 AM,4/14/2022

## 2022-04-14 NOTE — PROGRESS NOTES
Physical Therapy    Physical Therapy Treatment Note  Name: Beatrice Spring MRN: [id number] :   1945   Date:  2022   Admission Date: 2022 Room:  841743-   Restrictions/Precautions:  Restrictions/Precautions  Restrictions/Precautions: General Precautions         Subjective:  Patient states:  \"I haven't walked in the hallway since yesterday  Pain:   Location, Type, Intensity (0/10 to 10/10):  melina    Objective:    Observation:  Pt supine in bed upon entry    Treatment, including education/measures:  Pt agreeable to participating in therapy at this time. Therapeutic Activity Training:   Therapeutic activity training was instructed today. Cues were given for safety, sequence, UE/LE placement, awareness, and balance. Activities performed today included bed mobility training, sup-sit, sit-stand. Pt completed supine to sit with supervision. Pt completed sit <> stand from bed/chair/toilet with supervision. Pt completed sit to supine with supervision. Pt educated on and demonstrated understanding of importance of regular OOB mobility/ambulation throughout remainder of hospital stay. Gait Training:  Cues were given for safety, sequence, device management, balance, posture, awareness, path. Pt ambulated 200 feet + 150 feet with SBAx1/supervision with a decreased gait speed and a decreased step length bilaterally. Pt provided with verbal cues for directions in order to successfully navigate hallway and return to correct room. Safety  Patient left safely in the bed, with call light/phone in reach with alarm applied. Gait belt and mask were used for transfers and gait. Assessment / Impression:       Patient's tolerance of treatment:  Good; patient tolerated ambulation in hallway today   Adverse Reaction: none  Significant change in status and impact:  none  Barriers to improvement:  Decreased endurance    Plan for Next Session:    Continue progressing toward goals per plan of care. Progress independence with transfers and ambulation as tolerated and appropriate. Progress ambulation distance as tolerated and appropriate. Time in:  1637  Time out:  1703  Timed treatment minutes:  26  Total treatment time:  26    Previously filed items:  Social/Functional History  Lives With: Alone  Type of Home: House  Home Layout: Bed/Bath upstairs,Two level (12 steps with a handrail to access bed/bath)  Home Access: Stairs to enter with rails  Entrance Stairs - Number of Steps: 1  Bathroom Shower/Tub: Tub/Shower unit  Bathroom Toilet: Standard  ADL Assistance: Independent  Homemaking Assistance: Independent  Ambulation Assistance: Independent  Transfer Assistance: Independent  Active : Yes  Occupation: Retired  Type of occupation: [employer]      Long term goals  Time Frame for Long term goals :  In one week:  Long term goal 1: Pt will complete all transfers independently  Long term goal 2: Pt will ambulate 500 feet independently  Long term goal 3: Pt will independently ascend/descend 12 steps with a handrail  Long term goal 4: Pt will independently complete 3 sets of 10 reps of BLE AROM exercises in available and allowed ROM    Electronically signed by:      Moisés Hirsch PT, DPT  License #: [id number]

## 2022-04-15 LAB
ANION GAP SERPL CALCULATED.3IONS-SCNC: 12 MMOL/L (ref 4–16)
BASOPHILS ABSOLUTE: 0 K/CU MM
BASOPHILS RELATIVE PERCENT: 0.4 % (ref 0–1)
BUN BLDV-MCNC: 19 MG/DL (ref 6–23)
CALCIUM SERPL-MCNC: 9 MG/DL (ref 8.3–10.6)
CHLORIDE BLD-SCNC: 102 MMOL/L (ref 99–110)
CO2: 22 MMOL/L (ref 21–32)
CREAT SERPL-MCNC: 1 MG/DL (ref 0.9–1.3)
DIFFERENTIAL TYPE: ABNORMAL
EOSINOPHILS ABSOLUTE: 0.2 K/CU MM
EOSINOPHILS RELATIVE PERCENT: 2.1 % (ref 0–3)
GFR AFRICAN AMERICAN: >60 ML/MIN/1.73M2
GFR NON-AFRICAN AMERICAN: >60 ML/MIN/1.73M2
GLUCOSE BLD-MCNC: 142 MG/DL (ref 70–99)
HCT VFR BLD CALC: 39.3 % (ref 42–52)
HEMOGLOBIN: 12.8 GM/DL (ref 13.5–18)
IMMATURE NEUTROPHIL %: 0.6 % (ref 0–0.43)
LYMPHOCYTES ABSOLUTE: 1.4 K/CU MM
LYMPHOCYTES RELATIVE PERCENT: 13.3 % (ref 24–44)
MCH RBC QN AUTO: 30 PG (ref 27–31)
MCHC RBC AUTO-ENTMCNC: 32.6 % (ref 32–36)
MCV RBC AUTO: 92.3 FL (ref 78–100)
MONOCYTES ABSOLUTE: 1.2 K/CU MM
MONOCYTES RELATIVE PERCENT: 10.8 % (ref 0–4)
NUCLEATED RBC %: 0 %
PDW BLD-RTO: 13.7 % (ref 11.7–14.9)
PLATELET # BLD: 356 K/CU MM (ref 140–440)
PMV BLD AUTO: 9.8 FL (ref 7.5–11.1)
POTASSIUM SERPL-SCNC: 4 MMOL/L (ref 3.5–5.1)
RBC # BLD: 4.26 M/CU MM (ref 4.6–6.2)
SEGMENTED NEUTROPHILS ABSOLUTE COUNT: 7.9 K/CU MM
SEGMENTED NEUTROPHILS RELATIVE PERCENT: 72.8 % (ref 36–66)
SODIUM BLD-SCNC: 136 MMOL/L (ref 135–145)
TOTAL IMMATURE NEUTOROPHIL: 0.06 K/CU MM
TOTAL NUCLEATED RBC: 0 K/CU MM
WBC # BLD: 10.8 K/CU MM (ref 4–10.5)

## 2022-04-15 PROCEDURE — 2580000003 HC RX 258: Performed by: STUDENT IN AN ORGANIZED HEALTH CARE EDUCATION/TRAINING PROGRAM

## 2022-04-15 PROCEDURE — 97116 GAIT TRAINING THERAPY: CPT

## 2022-04-15 PROCEDURE — 80048 BASIC METABOLIC PNL TOTAL CA: CPT

## 2022-04-15 PROCEDURE — 6370000000 HC RX 637 (ALT 250 FOR IP): Performed by: STUDENT IN AN ORGANIZED HEALTH CARE EDUCATION/TRAINING PROGRAM

## 2022-04-15 PROCEDURE — 6360000002 HC RX W HCPCS: Performed by: STUDENT IN AN ORGANIZED HEALTH CARE EDUCATION/TRAINING PROGRAM

## 2022-04-15 PROCEDURE — 85027 COMPLETE CBC AUTOMATED: CPT

## 2022-04-15 PROCEDURE — 94761 N-INVAS EAR/PLS OXIMETRY MLT: CPT

## 2022-04-15 PROCEDURE — 96372 THER/PROPH/DIAG INJ SC/IM: CPT

## 2022-04-15 PROCEDURE — 36415 COLL VENOUS BLD VENIPUNCTURE: CPT

## 2022-04-15 PROCEDURE — 1200000000 HC SEMI PRIVATE

## 2022-04-15 PROCEDURE — 85025 COMPLETE CBC W/AUTO DIFF WBC: CPT

## 2022-04-15 PROCEDURE — 96366 THER/PROPH/DIAG IV INF ADDON: CPT

## 2022-04-15 RX ADMIN — METOPROLOL SUCCINATE 100 MG: 50 TABLET, EXTENDED RELEASE ORAL at 09:10

## 2022-04-15 RX ADMIN — AMLODIPINE BESYLATE 10 MG: 5 TABLET ORAL at 09:10

## 2022-04-15 RX ADMIN — LEVOTHYROXINE SODIUM 50 MCG: 25 TABLET ORAL at 05:36

## 2022-04-15 RX ADMIN — HEPARIN SODIUM 5000 UNITS: 5000 INJECTION INTRAVENOUS; SUBCUTANEOUS at 15:42

## 2022-04-15 RX ADMIN — SODIUM CHLORIDE, PRESERVATIVE FREE 10 ML: 5 INJECTION INTRAVENOUS at 09:11

## 2022-04-15 RX ADMIN — ATORVASTATIN CALCIUM 10 MG: 10 TABLET, FILM COATED ORAL at 20:33

## 2022-04-15 RX ADMIN — HEPARIN SODIUM 5000 UNITS: 5000 INJECTION INTRAVENOUS; SUBCUTANEOUS at 20:33

## 2022-04-15 RX ADMIN — FENOFIBRATE 160 MG: 160 TABLET ORAL at 09:10

## 2022-04-15 RX ADMIN — SODIUM CHLORIDE, PRESERVATIVE FREE 10 ML: 5 INJECTION INTRAVENOUS at 21:10

## 2022-04-15 RX ADMIN — CEFTRIAXONE 1000 MG: 1 INJECTION, POWDER, FOR SOLUTION INTRAMUSCULAR; INTRAVENOUS at 20:38

## 2022-04-15 RX ADMIN — HEPARIN SODIUM 5000 UNITS: 5000 INJECTION INTRAVENOUS; SUBCUTANEOUS at 05:36

## 2022-04-15 NOTE — CARE COORDINATION
Reviewed chart and discussed in [facility], Dr Sejal Valencia awaiting Culture results not sure if will d/c on po or IV atb. PS to [facility] with [facility] to start infusion process in case pt d/cs this weekend on iv atb.

## 2022-04-15 NOTE — PROGRESS NOTES
Comprehensive Nutrition Assessment    Type and Reason for Visit:  Reassess    Nutrition Recommendations/Plan:   Continue current diet and supplements as ordered     Nutrition Assessment:  Appetite has improved per pt. Consumed % of meals this morning. Enjoys the ensure products, drinking supplements sent. Denies any N/V/P. No weight loss x 1 yr per chart review. Monitor as low nutrition risk at this time. Estimated Daily Nutrient Needs:  Energy (kcal):  9697-4903 (22-25 kcal/kg); Weight Used for Energy Requirements:  Current     Protein (g):  89-98 (1.0-1.1 g/kg); Weight Used for Protein Requirements:  Current        Fluid (ml/day):  2000; Method Used for Fluid Requirements:  1 ml/kcal      Nutrition Related Findings:  Glu 129      Wounds:  None       Current Nutrition Therapies:    ADULT DIET; Regular; Low Fat/Low Chol/High Fiber/MIRNA; No Added Salt (3-4 gm)  ADULT ORAL NUTRITION SUPPLEMENT; Breakfast, Dinner; Standard High Calorie/High Protein Oral Supplement    Anthropometric Measures:  · Height: 5' 11\" (180.3 cm)  · Current Body Weight: 195 lb (88.5 kg)   · Admission Body Weight: 196 lb 6.9 oz (89.1 kg) (4/13/22)    · Usual Body Weight: 172 lb 9.6 oz (78.3 kg) (4/27/21)     · Ideal Body Weight: 172 lbs; % Ideal Body Weight 113.4 %   · BMI: 27.2  · Adjusted Body Weight:  ; No Adjustment   · Adjusted BMI:      · BMI Categories: Overweight (BMI 25.0-29. 9)       Nutrition Diagnosis:   · Predicted inadequate energy intake related to  (decreased appetite related to acute illness) as evidenced by poor intake prior to admission    Nutrition Interventions:   Food and/or Nutrient Delivery:  Continue Current Diet,Continue Oral Nutrition Supplement  Nutrition Education/Counseling:  No recommendation at this time   Coordination of Nutrition Care:  Continue to monitor while inpatient,Coordination of Community Care    Goals:  Pt will consume greater than 50% of meals and supplements       Nutrition Monitoring and Evaluation:   Behavioral-Environmental Outcomes:  None Identified   Food/Nutrient Intake Outcomes:  Food and Nutrient Intake,Supplement Intake  Physical Signs/Symptoms Outcomes:  Biochemical Data,GI Status,Meal Time Behavior,Weight,Fluid Status or Edema     Discharge Planning:    Continue Oral Nutrition Supplement,Continue current diet     Electronically signed by Yessy Vazquez RD, LD on 4/15/22 at 10:57 AM EDT    Contact: [phone]

## 2022-04-15 NOTE — PROGRESS NOTES
[facility]  [facility] [address]   Progress Note  [facility] 0 1 2      Date: 4/15/2022   Patient: Ivana Go   : 1945   DOA: 2022   MRN: [id number]   ROOM#: [id number]     Admit Date: 2022     Collaborating Urologist on Call at time of admission: Dr. Adela Zimmer  CC: Fatigue  Reason for Consult: UTI, sepsis    Subjective:     Pain: mild, no nausea and no vomiting,   Bowel Movement/Flatus:   Yes  Voiding:  Easily w mild dysuria    Pt resting in bed, states he is feeling better today with only mild, intermittent dysuria    Objective:    Vitals:    /76   Pulse 78   Temp 99 °F (37.2 °C) (Oral)   Resp 18   Ht 5' 11\" (1.803 m)   Wt 195 lb (88.5 kg)   SpO2 99%   BMI 27.20 kg/m²    Temp  Av.3 °F (37.4 °C)  Min: 98.2 °F (36.8 °C)  Max: 100.6 °F (38.1 °C)       Intake/Output Summary (Last 24 hours) at 4/15/2022 1851  Last data filed at 2022 1511  Gross per 24 hour   Intake 2460 ml   Output --   Net 2460 ml     Physical Exam:   General appearance: alert, appears stated age, cooperative, no distress and mildly obese  Head: Normocephalic, without obvious abnormality, atraumatic  Back: No CVA tenderness  Abdomen: Soft, non-tender, non-distended    Labs:   WBC:    Lab Results   Component Value Date    WBC 14.5 2022      Hemoglobin/Hematocrit:    Lab Results   Component Value Date    HGB 12.4 2022    HCT 37.8 2022      BMP:   Lab Results   Component Value Date     2022    K 3.8 2022     2022    CO2 22 2022    BUN 20 2022    LABALBU 3.4 2022    CREATININE 1.1 2022    CALCIUM 8.6 2022    GFRAA >60 2022    LABGLOM >60 2022     Blood Culture: NGTD  Urine Culture: +E.coli >100K CFU/ml, sensitivities pending    Imaging:   CT ABDOMEN PELVIS WO CONTRAST Additional Contrast? None    Result Date: 2022  EXAMINATION: CT OF THE ABDOMEN AND PELVIS WITHOUT CONTRAST; CT OF THE CHEST WITHOUT CONTRAST 4/12/2022 6:14 pm TECHNIQUE: CT of the abdomen and pelvis was performed without the administration of intravenous contrast. Multiplanar reformatted images are provided for review. Dose modulation, iterative reconstruction, and/or weight based adjustment of the mA/kV was utilized to reduce the radiation dose to as low as reasonably achievable.; CT of the chest was performed without the administration of intravenous contrast. Multiplanar reformatted images are provided for review. Dose modulation, iterative reconstruction, and/or weight based adjustment of the mA/kV was utilized to reduce the radiation dose to as low as reasonably achievable. COMPARISON: None. HISTORY: ORDERING SYSTEM PROVIDED HISTORY: white count 25,000, fatigue TECHNOLOGIST PROVIDED HISTORY: Reason for exam:-> white count 25 k, fatigue Additional Contrast?-> none Reason for Exam: white count 25 k, fatigue FINDINGS: Mediastinum: Cardiac structures and great vessels appear unremarkable with exception of calcific atherosclerotic disease. No pericardial effusion. Left hilar calcification typical of sequela from old granulomatous disease. Posterior mediastinal structures appear unremarkable. No mediastinal or hilar adenopathy. Lungs/pleura: Densely calcified nodules lateral mid left and right lung, typical of sequela from old granulomatous disease. Well-expanded and otherwise clear, without soft tissue or ground-glass pulmonary nodule. No pleural effusion or pneumothorax. No inspissated secretions or endobronchial lesion evident. Organs: Mild hepatic morphologic features typical of cirrhosis. The liver appears fatty and diffusely mildly heterogeneous. No discrete mass lesion evident. The gallbladder, spleen, pancreas, adrenal glands and kidneys appear unremarkable. GI/Bowel: No diffuse or focal bowel wall thickening evident. No inflammatory changes evident. No obstruction is seen. The normal appendix is seen right lower quadrant.  Pelvis: Prostate gland and seminal vesicles appear unremarkable. Urinary bladder is partially filled, diffusely mild thick walled appearance. Mild pericystic inflammatory changes. No adenopathy or free fluid. Peritoneum/Retroperitoneum: Mild calcific atherosclerosis of the aorta, otherwise unremarkable appearance of the aorta with no aneurysm. Unremarkable appearance of the IVC. No adenopathy or fluid. Bones/Soft Tissues: No acute superficial soft tissue or osseous structure abnormality evident. 1.  CT CHEST: No acute abnormality. 2. Mild calcific atherosclerosis aorta and coronary arteries. 3. Otherwise unremarkable chest CT. No finding to suggest etiology for fatigue and elevated white blood cell count. 4. CT ABDOMEN/PELVIS: Acute cystitis suspected given the findings; correlate with urinalysis. 5.  No findings to suggest acute appendicitis; no ureter calculus or hydronephrosis. 6. Mild hepatic morphologic features suggestive of cirrhosis, though nonspecific. 7. Hepatic steatosis. 8.  Calcific atherosclerotic disease aorta. CT CHEST WO CONTRAST    Result Date: 4/12/2022  EXAMINATION: CT OF THE ABDOMEN AND PELVIS WITHOUT CONTRAST; CT OF THE CHEST WITHOUT CONTRAST 4/12/2022 6:14 pm TECHNIQUE: CT of the abdomen and pelvis was performed without the administration of intravenous contrast. Multiplanar reformatted images are provided for review. Dose modulation, iterative reconstruction, and/or weight based adjustment of the mA/kV was utilized to reduce the radiation dose to as low as reasonably achievable.; CT of the chest was performed without the administration of intravenous contrast. Multiplanar reformatted images are provided for review. Dose modulation, iterative reconstruction, and/or weight based adjustment of the mA/kV was utilized to reduce the radiation dose to as low as reasonably achievable. COMPARISON: None.  HISTORY: ORDERING SYSTEM PROVIDED HISTORY: white count 25,000, fatigue TECHNOLOGIST PROVIDED HISTORY: Reason for exam:-> white count 25 k, fatigue Additional Contrast?-> none Reason for Exam: white count 25 k, fatigue FINDINGS: Mediastinum: Cardiac structures and great vessels appear unremarkable with exception of calcific atherosclerotic disease. No pericardial effusion. Left hilar calcification typical of sequela from old granulomatous disease. Posterior mediastinal structures appear unremarkable. No mediastinal or hilar adenopathy. Lungs/pleura: Densely calcified nodules lateral mid left and right lung, typical of sequela from old granulomatous disease. Well-expanded and otherwise clear, without soft tissue or ground-glass pulmonary nodule. No pleural effusion or pneumothorax. No inspissated secretions or endobronchial lesion evident. Organs: Mild hepatic morphologic features typical of cirrhosis. The liver appears fatty and diffusely mildly heterogeneous. No discrete mass lesion evident. The gallbladder, spleen, pancreas, adrenal glands and kidneys appear unremarkable. GI/Bowel: No diffuse or focal bowel wall thickening evident. No inflammatory changes evident. No obstruction is seen. The normal appendix is seen right lower quadrant. Pelvis: Prostate gland and seminal vesicles appear unremarkable. Urinary bladder is partially filled, diffusely mild thick walled appearance. Mild pericystic inflammatory changes. No adenopathy or free fluid. Peritoneum/Retroperitoneum: Mild calcific atherosclerosis of the aorta, otherwise unremarkable appearance of the aorta with no aneurysm. Unremarkable appearance of the IVC. No adenopathy or fluid. Bones/Soft Tissues: No acute superficial soft tissue or osseous structure abnormality evident. 1.  CT CHEST: No acute abnormality. 2. Mild calcific atherosclerosis aorta and coronary arteries. 3. Otherwise unremarkable chest CT. No finding to suggest etiology for fatigue and elevated white blood cell count.  4. CT ABDOMEN/PELVIS: Acute cystitis suspected given the findings; correlate with urinalysis. 5.  No findings to suggest acute appendicitis; no ureter calculus or hydronephrosis. 6. Mild hepatic morphologic features suggestive of cirrhosis, though nonspecific. 7. Hepatic steatosis. 8.  Calcific atherosclerotic disease aorta. XR CHEST PORTABLE    Result Date: 4/12/2022  EXAMINATION: ONE XRAY VIEW OF THE CHEST 4/12/2022 4:42 pm COMPARISON: CT chest, abdomen, and pelvis same day HISTORY: ORDERING SYSTEM PROVIDED HISTORY: fatigued, easily SOB with exertion TECHNOLOGIST PROVIDED HISTORY: Reason for exam:->fatigued, easily SOB with exertion Reason for Exam: fatigued, easily SOB with exertion FINDINGS: The lungs are without acute focal process. There is no effusion or pneumothorax. The cardiomediastinal silhouette is without acute process. The osseous structures are without acute process. No acute process. Assessment & Plan:      Herminio Galvan is a 78y.o. year old male admitted 4/12/2022 for ANTHONY, UTI.     1) Complicated UTI: urine cx +E.coli >100K CFU/ml, sensitivities pending              CT ap 4/12/22 shows unremarkable kidneys, prostate gland and seminal vesicles. Urinary bladder is partially filled, diffusely mild thick walled appearance. WBC 14.5, improving (labs pending for today); T-max 100.6 overnight, currently afebrile              On IV Rocephin; recommend discharge on 5 days of appropriate oral abx. 2) ANTHONY: Cr 1.1, improved from 1.8 4/12/22     Pt stable from a  standpoint. Will sign off, please call with any questions. Pt to follow up with our office in 2 weeks for reevaluation. Patient seen and examined, chart reviewed.      Electronically signed by Cheryl Cochran PA-C on 4/15/2022 at 8:14 AM

## 2022-04-15 NOTE — PROGRESS NOTES
Physical Therapy    Physical Therapy Treatment Note  Name: Dominique Tineo MRN: [id number] :   1945   Date:  4/15/2022   Admission Date: 2022 Room:  [facility]-T   Restrictions/Precautions:  Restrictions/Precautions  Restrictions/Precautions: General Precautions       Communication with other providers:    Subjective:  Patient states: Motivated and agreeable to tx  Pain:   Location, Type, Intensity (0/10 to 10/10): Denies having pain at this time  Objective:    Observation:  Alert and oriented  Treatment, including education/measures:  Sup to sit Essence  Sit<=>stand Essence  amb with without assistive device 600' sba. Pt slightly unsteady x 1 when turing to go in room and bumped right shoulder on door frame but did not need assist to recover. Up/down 12 steps sba with right hand rail with short rest break x1. Safety  Patient left safely in the chair, with call light/phone in reach with alarm applied. Gait belt and mask were used for transfers and gait. Assessment / Impression:       Patient's tolerance of treatment:  good  Adverse Reaction: na  Significant change in status and impact:  na  Barriers to improvement:  none  Plan for Next Session:    Cont. POC  Time in:  1100  Time out:  1125  Timed treatment minutes:  25  Total treatment time:  25    Previously filed items:  Social/Functional History  Lives With: Alone  Type of Home: House  Home Layout: Bed/Bath upstairs,Two level (12 steps with a handrail to access bed/bath)  Home Access: Stairs to enter with rails  Entrance Stairs - Number of Steps: 1  Bathroom Shower/Tub: Tub/Shower unit  Bathroom Toilet: Standard  ADL Assistance: Independent  Homemaking Assistance: Independent  Ambulation Assistance: Independent  Transfer Assistance: Independent  Active : Yes  Occupation: Retired  Type of occupation: [employer]      Long term goals  Time Frame for Long term goals :  In one week:  Long term goal 1: Pt will complete all transfers independently  Long term goal 2: Pt will ambulate 500 feet independently  Long term goal 3: Pt will independently ascend/descend 12 steps with a handrail  Long term goal 4: Pt will independently complete 3 sets of 10 reps of BLE AROM exercises in available and allowed ROM    Electronically signed by:    Mart Willson PTA  4/15/2022, 8:40 AM

## 2022-04-15 NOTE — PROGRESS NOTES
Tavia Alcantar MD    Hospitalist Progress Note      Name:  Herminio Galvan /Age/Sex: 1945  (78 y.o. male)   MRN & CSN:  [id number] & [id number] Admission Date/Time: 2022  4:31 PM   Location:  5919/9390 PCP: Ned Loaiza MD       I saw and examined the patient on 4/15/2022 at 09:30 AM    Hospital Day: 4  Barriers to discharge: Cultures/IV antibiotics  Assessment and Plan:     78 y. o. male with pmh of hypertension, hyperlipidemia, prediabetes, hypothyroidism who presents with generalized weakness in the past 1 week.  Patient was found UTI with white cell 26.  And ANTHONY with creatinine level 1.8      Sepsis due to UTI (improving)- NICOLASA  Mx as below     Complicated UTI with failure of 1 week duration of outpatient oral antibiotic.  -Completed 1 course of oral antibiotics from PCP (not able to tell me the name of antibiotics)  -UA showed small leukocyte and many bacteria, urine culture is pending  -Currently on Rocephin, clinically improved  -Appreciate urology input: We will obtain up PVR to ensure proper bladder emptying, continue IV antibiotics, follow-up cultures.     ANTHONY  Resolved     Hypertension  -Continue amlodipine 10 mg daily and metoprolol  mg daily  -Hold diuretics     Hyperlipidemia  -Continue statin and fenofibrate     Hypothyroidism  -TSH and T4 normal  -Continue home meds Synthroid 50 MCG     DVT prophylaxis  -Heparin     Subjective:     Patient seen and examined at bedside. Reports feeling better this morning. Actively participating in physical therapy. No acute events overnight. Now afebrile    Objective:      Intake/Output Summary (Last 24 hours) at 4/15/2022 1513  Last data filed at 4/15/2022 0930  Gross per 24 hour   Intake 240 ml   Output --   Net 240 ml      Vitals:   Vitals:    04/15/22 1500   BP: 116/85   Pulse: 66   Resp:    Temp: 98.1 °F (36.7 °C)   SpO2: 99%       Ten point ROS reviewed negative, unless as noted above    Physical Exam:     GENERAL APPEARANCE: not in any acute distress,  appears comfortable. NECK: Supple, no JVD.   CARDIOVASCULAR: Regular rate and rhythm, no murmurs, rubs or gallops  LUNGS: clear to auscultation bilaterally   ABDOMEN: normoactive bowel sounds, soft non-tender and non distended  EXTREMITIES: No edema  MUSCULOSKELETAL S: normal movement and normal bulk in all ext  NEUROLOGIC: Alert and oriented x 3- grossly non focal exam, moving all extremities   SKIN: No Rashes       Electronically signed by Eron Mckeon MD on 4/15/2022 at 3:13 PM               Medications:   Medications:    amLODIPine  10 mg Oral Daily    atorvastatin  10 mg Oral Daily    fenofibrate  160 mg Oral Daily    levothyroxine  50 mcg Oral Daily    metoprolol succinate  100 mg Oral Daily    cefTRIAXone (ROCEPHIN) IV  1,000 mg IntraVENous Q24H    sodium chloride flush  5-40 mL IntraVENous BID    heparin (porcine)  5,000 Units SubCUTAneous 3 times per day      Infusions:    sodium chloride       PRN Meds: sodium chloride flush, 5-40 mL, PRN  sodium chloride, , PRN  ondansetron, 4 mg, Q8H PRN   Or  ondansetron, 4 mg, Q6H PRN  acetaminophen, 650 mg, Q6H PRN   Or  acetaminophen, 650 mg, Q6H PRN  polyethylene glycol, 17 g, Daily PRN

## 2022-04-15 NOTE — PROGRESS NOTES
Physician Progress Note      Donna Salguero  CSN #:                  [id number]  :                       1945  ADMIT DATE:       2022 4:31 PM  DISCH DATE:  RESPONDING  PROVIDER #:        Jailyn Jones          QUERY TEXT:    Pt admitted with generalized weakness on . Pt noted to have sepsis due to   UTI documented on .  If possible, please document in progress notes and   discharge summary the present on admission status of sepsis:    The medical record reflects the following:  Risk Factors: UTI  Clinical Indicators: WBC 25.9, Tmax 101.3, HR 96  Treatment: IVF, IV Rocephin    RACHANA CanoN, RN, [facility]  Clinical   [phone]  Options provided:  -- Yes, sepsis was present at the time of the order to admit to the hospital  -- No, sepsis was not present on admission and developed during the inpatient   stay  -- Other - I will add my own diagnosis  -- Disagree - Not applicable / Not valid  -- Disagree - Clinically unable to determine / Unknown  -- Refer to Clinical Documentation Reviewer    PROVIDER RESPONSE TEXT:    Yes, sepsis was present at the time of the order to admit to the hospital.    Query created by: Maribel Sesay on 4/15/2022 12:10 PM      Electronically signed by:  Jailyn Jones 4/15/2022 7:06 PM

## 2022-04-16 VITALS
SYSTOLIC BLOOD PRESSURE: 110 MMHG | DIASTOLIC BLOOD PRESSURE: 57 MMHG | RESPIRATION RATE: 16 BRPM | HEIGHT: 71 IN | HEART RATE: 67 BPM | WEIGHT: 195 LBS | TEMPERATURE: 98.4 F | OXYGEN SATURATION: 98 % | BODY MASS INDEX: 27.3 KG/M2

## 2022-04-16 LAB
ANION GAP SERPL CALCULATED.3IONS-SCNC: 10 MMOL/L (ref 4–16)
BASOPHILS ABSOLUTE: 0.1 K/CU MM
BASOPHILS RELATIVE PERCENT: 0.4 % (ref 0–1)
BUN BLDV-MCNC: 22 MG/DL (ref 6–23)
CALCIUM SERPL-MCNC: 8.7 MG/DL (ref 8.3–10.6)
CHLORIDE BLD-SCNC: 105 MMOL/L (ref 99–110)
CO2: 23 MMOL/L (ref 21–32)
CREAT SERPL-MCNC: 1 MG/DL (ref 0.9–1.3)
CULTURE: ABNORMAL
CULTURE: ABNORMAL
DIFFERENTIAL TYPE: ABNORMAL
EOSINOPHILS ABSOLUTE: 0.3 K/CU MM
EOSINOPHILS RELATIVE PERCENT: 2.8 % (ref 0–3)
GFR AFRICAN AMERICAN: >60 ML/MIN/1.73M2
GFR NON-AFRICAN AMERICAN: >60 ML/MIN/1.73M2
GLUCOSE BLD-MCNC: 134 MG/DL (ref 70–99)
HCT VFR BLD CALC: 38.4 % (ref 42–52)
HEMOGLOBIN: 12.4 GM/DL (ref 13.5–18)
IMMATURE NEUTROPHIL %: 0.6 % (ref 0–0.43)
LYMPHOCYTES ABSOLUTE: 2.1 K/CU MM
LYMPHOCYTES RELATIVE PERCENT: 18.2 % (ref 24–44)
Lab: ABNORMAL
MCH RBC QN AUTO: 30.2 PG (ref 27–31)
MCHC RBC AUTO-ENTMCNC: 32.3 % (ref 32–36)
MCV RBC AUTO: 93.4 FL (ref 78–100)
MONOCYTES ABSOLUTE: 1.4 K/CU MM
MONOCYTES RELATIVE PERCENT: 12.2 % (ref 0–4)
NUCLEATED RBC %: 0 %
PDW BLD-RTO: 13.8 % (ref 11.7–14.9)
PLATELET # BLD: 351 K/CU MM (ref 140–440)
PMV BLD AUTO: 10.1 FL (ref 7.5–11.1)
POTASSIUM SERPL-SCNC: 4.5 MMOL/L (ref 3.5–5.1)
RBC # BLD: 4.11 M/CU MM (ref 4.6–6.2)
SEGMENTED NEUTROPHILS ABSOLUTE COUNT: 7.7 K/CU MM
SEGMENTED NEUTROPHILS RELATIVE PERCENT: 65.8 % (ref 36–66)
SODIUM BLD-SCNC: 138 MMOL/L (ref 135–145)
SPECIMEN: ABNORMAL
TOTAL IMMATURE NEUTOROPHIL: 0.07 K/CU MM
TOTAL NUCLEATED RBC: 0 K/CU MM
WBC # BLD: 11.7 K/CU MM (ref 4–10.5)

## 2022-04-16 PROCEDURE — 96372 THER/PROPH/DIAG INJ SC/IM: CPT

## 2022-04-16 PROCEDURE — 6370000000 HC RX 637 (ALT 250 FOR IP): Performed by: STUDENT IN AN ORGANIZED HEALTH CARE EDUCATION/TRAINING PROGRAM

## 2022-04-16 PROCEDURE — 80048 BASIC METABOLIC PNL TOTAL CA: CPT

## 2022-04-16 PROCEDURE — 36415 COLL VENOUS BLD VENIPUNCTURE: CPT

## 2022-04-16 PROCEDURE — 94761 N-INVAS EAR/PLS OXIMETRY MLT: CPT

## 2022-04-16 PROCEDURE — 85025 COMPLETE CBC W/AUTO DIFF WBC: CPT

## 2022-04-16 PROCEDURE — 6360000002 HC RX W HCPCS: Performed by: STUDENT IN AN ORGANIZED HEALTH CARE EDUCATION/TRAINING PROGRAM

## 2022-04-16 PROCEDURE — 2580000003 HC RX 258: Performed by: STUDENT IN AN ORGANIZED HEALTH CARE EDUCATION/TRAINING PROGRAM

## 2022-04-16 RX ORDER — CEFUROXIME AXETIL 500 MG/1
500 TABLET ORAL 2 TIMES DAILY
Qty: 10 TABLET | Refills: 0 | Status: SHIPPED | OUTPATIENT
Start: 2022-04-16 | End: 2022-04-21

## 2022-04-16 RX ADMIN — FENOFIBRATE 160 MG: 160 TABLET ORAL at 10:39

## 2022-04-16 RX ADMIN — LEVOTHYROXINE SODIUM 50 MCG: 25 TABLET ORAL at 05:27

## 2022-04-16 RX ADMIN — HEPARIN SODIUM 5000 UNITS: 5000 INJECTION INTRAVENOUS; SUBCUTANEOUS at 05:28

## 2022-04-16 RX ADMIN — SODIUM CHLORIDE, PRESERVATIVE FREE 10 ML: 5 INJECTION INTRAVENOUS at 10:38

## 2022-04-16 NOTE — DISCHARGE SUMMARY
Discharge Summary  Kenneth Najjar, MD     Name:  Wali Edge /Age/Sex: 1945  (78 y.o. male)   MRN & CSN:  [id number] & [id number] Admission Date/Time: 2022  4:31 PM   Attending:  Kenneth Najjar, MD Discharging Physician: Kenneth Najjar, MD     Hospital Course:       78 y. o. male with pmh of hypertension, hyperlipidemia, prediabetes, hypothyroidism who presents with generalized weakness in the past 1 week.  Patient was found UTI with white cell 26.  And ANTHONY with creatinine level 1.8. He had the following significant hospital course       Sepsis due to UTI (improving)- NICOLASA  Mx as below. Improved     Complicated UTI with failure of 1 week duration of outpatient oral antibiotic.  -Completed 1 course of oral antibiotics from PCP (not able to tell me the name of antibiotics)  -UA showed small leukocyte and many bacteria, urine culture is grew pansensitive E. Coli. Mx with  Rocephin while inpatient, will switch to Keflex to complete course as outpatient. Blood cultures remain negative. Urology recommended outpatient follow-up in clinic with 2 weeks     ANTHONY  Resolved     Hypertension  -Continue amlodipine 10 mg daily and metoprolol  mg daily     Hyperlipidemia  -Continue statin and fenofibrate     Hypothyroidism  -TSH and T4 normal  -Continue home meds Synthroid 50 MCG         The patient expressed appropriate understanding of and agreement with the discharge recommendations, medications, and plan.      Consults this admission:  IP CONSULT TO HOSPITALIST  IP CONSULT TO UROLOGY  IP CONSULT TO CASE MANAGEMENT  IP CONSULT TO HOME CARE NEEDS    Discharge Instruction:     Follow up appointments:     [address] [place] [address]  [phone]          Primary care physician:  within 2 weeks    Diet:  cardiac diet     Activity: activity as tolerated    Disposition: Discharged to:   [x]Home, [x]HHC, []SNF, []Acute Rehab, []Hospice     Condition on discharge: Stable    Discharge Medications:        Medication List      START taking these medications    cefUROXime 500 MG tablet  Commonly known as: CEFTIN  Take 1 tablet by mouth 2 times daily for 5 days        CONTINUE taking these medications    amLODIPine 10 MG tablet  Commonly known as: NORVASC     bisoprolol-hydroCHLOROthiazide 10-6.25 MG per tablet  Commonly known as: ZIAC     fenofibrate 160 MG tablet  Commonly known as: TRIGLIDE     levothyroxine 50 MCG tablet  Commonly known as: SYNTHROID     lovastatin 40 MG tablet  Commonly known as: MEVACOR        STOP taking these medications    Bisoprolol Fumarate Powd           Where to Get Your Medications      These medications were sent to [facility], [address] -  [phone]  [address] [facility] [address]    Phone: [phone]   · cefUROXime 500 MG tablet          Objective Findings at Discharge:     BP (!) 110/57   Pulse 67   Temp 98.4 °F (36.9 °C)   Resp 16   Ht 5' 11\" (1.803 m)   Wt 195 lb (88.5 kg)   SpO2 98%   BMI 27.20 kg/m²            PHYSICAL EXAM     GEN:  Awake male, sitting upright in bed in no apparent distress. RESP:  CTAB, no  wheezes, rales or rhonchi. CVS:   RRR. No  peripheral edema. GI/:  S/NT/ND BS+   NEURO: No focal defecits. PSYCH:  Awake, alert, oriented x 3. Affect appropriate. LABS: Reviewd    IMAGING: Reviwed    32 Minutes spent in preparation of discharging this patient including face to face encounter, discussions with the patient/family, medication reconciliation, and transition of care preparation.      Electronically signed by Yandy Chen MD on 4/16/2022 at 12:57 PM

## 2022-04-16 NOTE — PROGRESS NOTES
Discharge orders received. This LPN removed IV from right AC without complication. Patient was given AVS and discharge instructions. Patient was transported via wheelchair to [facility] where son awaited his arrival.        No home health needs at this time due to patient discharge with oral antibiotics instead of IV antibiotics.

## 2022-04-17 LAB
CULTURE: NORMAL
Lab: NORMAL
SPECIMEN: NORMAL

## 2022-04-18 LAB
CULTURE: NORMAL
CULTURE: NORMAL
Lab: NORMAL
Lab: NORMAL
SPECIMEN: NORMAL
SPECIMEN: NORMAL

## 2023-03-03 ENCOUNTER — APPOINTMENT (OUTPATIENT)
Dept: CT IMAGING | Age: 78
DRG: 101 | End: 2023-03-03
Payer: COMMERCIAL

## 2023-03-03 ENCOUNTER — APPOINTMENT (OUTPATIENT)
Dept: MRI IMAGING | Age: 78
DRG: 101 | End: 2023-03-03
Payer: COMMERCIAL

## 2023-03-03 ENCOUNTER — HOSPITAL ENCOUNTER (INPATIENT)
Age: 78
LOS: 2 days | Discharge: HOME | DRG: 101 | End: 2023-03-05
Attending: EMERGENCY MEDICINE | Admitting: INTERNAL MEDICINE
Payer: COMMERCIAL

## 2023-03-03 DIAGNOSIS — R56.9 SEIZURE-LIKE ACTIVITY (HCC): Primary | ICD-10-CM

## 2023-03-03 DIAGNOSIS — R27.0 ATAXIA OF LEFT UPPER EXTREMITY: ICD-10-CM

## 2023-03-03 LAB
ANION GAP SERPL CALCULATED.3IONS-SCNC: 14 MMOL/L (ref 4–16)
APTT: 23.8 SECONDS (ref 25.1–37.1)
BASOPHILS ABSOLUTE: 0.1 K/CU MM
BASOPHILS RELATIVE PERCENT: 0.7 % (ref 0–1)
BILIRUBIN URINE: NEGATIVE MG/DL
BLOOD, URINE: NEGATIVE
BUN SERPL-MCNC: 18 MG/DL (ref 6–23)
CALCIUM SERPL-MCNC: 9.5 MG/DL (ref 8.3–10.6)
CHLORIDE BLD-SCNC: 97 MMOL/L (ref 99–110)
CHOLEST SERPL-MCNC: 172 MG/DL
CHP ED QC CHECK: NORMAL
CLARITY: CLEAR
CO2: 22 MMOL/L (ref 21–32)
COLOR: COLORLESS
COMMENT UA: ABNORMAL
CREAT SERPL-MCNC: 1.2 MG/DL (ref 0.9–1.3)
DIFFERENTIAL TYPE: ABNORMAL
EKG ATRIAL RATE: 67 BPM
EKG DIAGNOSIS: NORMAL
EKG P-R INTERVAL: 214 MS
EKG Q-T INTERVAL: 432 MS
EKG QRS DURATION: 100 MS
EKG QTC CALCULATION (BAZETT): 456 MS
EKG R AXIS: 25 DEGREES
EKG T AXIS: 27 DEGREES
EKG VENTRICULAR RATE: 67 BPM
EOSINOPHILS ABSOLUTE: 0.2 K/CU MM
EOSINOPHILS RELATIVE PERCENT: 1.9 % (ref 0–3)
ESTIMATED AVERAGE GLUCOSE: 137 MG/DL
GFR SERPL CREATININE-BSD FRML MDRD: >60 ML/MIN/1.73M2
GLUCOSE BLD-MCNC: 130 MG/DL
GLUCOSE BLD-MCNC: 130 MG/DL (ref 70–99)
GLUCOSE SERPL-MCNC: 135 MG/DL (ref 70–99)
GLUCOSE, URINE: NEGATIVE MG/DL
HBA1C MFR BLD: 6.4 % (ref 4.2–6.3)
HCT VFR BLD CALC: 36.2 % (ref 42–52)
HDLC SERPL-MCNC: 24 MG/DL
HEMOGLOBIN: 12.2 GM/DL (ref 13.5–18)
IMMATURE NEUTROPHIL %: 0.5 % (ref 0–0.43)
INR BLD: 1.14 INDEX
KETONES, URINE: NEGATIVE MG/DL
LDLC SERPL CALC-MCNC: 122 MG/DL
LEUKOCYTE ESTERASE, URINE: NEGATIVE
LV EF: 55 %
LVEF MODALITY: NORMAL
LYMPHOCYTES ABSOLUTE: 4.4 K/CU MM
LYMPHOCYTES RELATIVE PERCENT: 34 % (ref 24–44)
MCH RBC QN AUTO: 30.4 PG (ref 27–31)
MCHC RBC AUTO-ENTMCNC: 33.7 % (ref 32–36)
MCV RBC AUTO: 90.3 FL (ref 78–100)
MONOCYTES ABSOLUTE: 1.5 K/CU MM
MONOCYTES RELATIVE PERCENT: 11.4 % (ref 0–4)
NITRITE URINE, QUANTITATIVE: NEGATIVE
NUCLEATED RBC %: 0 %
PDW BLD-RTO: 13.5 % (ref 11.7–14.9)
PH, URINE: 7 (ref 5–8)
PLATELET # BLD: 387 K/CU MM (ref 140–440)
PMV BLD AUTO: 9.7 FL (ref 7.5–11.1)
POTASSIUM SERPL-SCNC: 4 MMOL/L (ref 3.5–5.1)
PROTEIN UA: NEGATIVE MG/DL
PROTHROMBIN TIME: 14.7 SECONDS (ref 11.7–14.5)
RBC # BLD: 4.01 M/CU MM (ref 4.6–6.2)
SEGMENTED NEUTROPHILS ABSOLUTE COUNT: 6.7 K/CU MM
SEGMENTED NEUTROPHILS RELATIVE PERCENT: 51.5 % (ref 36–66)
SODIUM BLD-SCNC: 133 MMOL/L (ref 135–145)
SPECIFIC GRAVITY UA: <1.005 (ref 1–1.03)
TOTAL IMMATURE NEUTOROPHIL: 0.06 K/CU MM
TOTAL NUCLEATED RBC: 0 K/CU MM
TRIGL SERPL-MCNC: 130 MG/DL
TROPONIN T: <0.01 NG/ML
TSH SERPL DL<=0.005 MIU/L-ACNC: 1.09 UIU/ML (ref 0.27–4.2)
UROBILINOGEN, URINE: 0.2 MG/DL (ref 0.2–1)
WBC # BLD: 12.9 K/CU MM (ref 4–10.5)

## 2023-03-03 PROCEDURE — 96365 THER/PROPH/DIAG IV INF INIT: CPT

## 2023-03-03 PROCEDURE — 94761 N-INVAS EAR/PLS OXIMETRY MLT: CPT

## 2023-03-03 PROCEDURE — 2580000003 HC RX 258: Performed by: NURSE PRACTITIONER

## 2023-03-03 PROCEDURE — 70553 MRI BRAIN STEM W/O & W/DYE: CPT

## 2023-03-03 PROCEDURE — 6360000004 HC RX CONTRAST MEDICATION: Performed by: NURSE PRACTITIONER

## 2023-03-03 PROCEDURE — 83036 HEMOGLOBIN GLYCOSYLATED A1C: CPT

## 2023-03-03 PROCEDURE — 85610 PROTHROMBIN TIME: CPT

## 2023-03-03 PROCEDURE — 85025 COMPLETE CBC W/AUTO DIFF WBC: CPT

## 2023-03-03 PROCEDURE — 6360000002 HC RX W HCPCS: Performed by: NURSE PRACTITIONER

## 2023-03-03 PROCEDURE — 93005 ELECTROCARDIOGRAM TRACING: CPT | Performed by: EMERGENCY MEDICINE

## 2023-03-03 PROCEDURE — 84443 ASSAY THYROID STIM HORMONE: CPT

## 2023-03-03 PROCEDURE — 84484 ASSAY OF TROPONIN QUANT: CPT

## 2023-03-03 PROCEDURE — 93010 ELECTROCARDIOGRAM REPORT: CPT | Performed by: INTERNAL MEDICINE

## 2023-03-03 PROCEDURE — 93306 TTE W/DOPPLER COMPLETE: CPT

## 2023-03-03 PROCEDURE — 6360000002 HC RX W HCPCS: Performed by: EMERGENCY MEDICINE

## 2023-03-03 PROCEDURE — 99223 1ST HOSP IP/OBS HIGH 75: CPT | Performed by: PSYCHIATRY & NEUROLOGY

## 2023-03-03 PROCEDURE — 80048 BASIC METABOLIC PNL TOTAL CA: CPT

## 2023-03-03 PROCEDURE — 82962 GLUCOSE BLOOD TEST: CPT

## 2023-03-03 PROCEDURE — 95819 EEG AWAKE AND ASLEEP: CPT

## 2023-03-03 PROCEDURE — 6360000004 HC RX CONTRAST MEDICATION: Performed by: EMERGENCY MEDICINE

## 2023-03-03 PROCEDURE — 85730 THROMBOPLASTIN TIME PARTIAL: CPT

## 2023-03-03 PROCEDURE — A9579 GAD-BASE MR CONTRAST NOS,1ML: HCPCS | Performed by: NURSE PRACTITIONER

## 2023-03-03 PROCEDURE — 70498 CT ANGIOGRAPHY NECK: CPT

## 2023-03-03 PROCEDURE — 6370000000 HC RX 637 (ALT 250 FOR IP): Performed by: EMERGENCY MEDICINE

## 2023-03-03 PROCEDURE — 2580000003 HC RX 258: Performed by: EMERGENCY MEDICINE

## 2023-03-03 PROCEDURE — 2580000003 HC RX 258: Performed by: STUDENT IN AN ORGANIZED HEALTH CARE EDUCATION/TRAINING PROGRAM

## 2023-03-03 PROCEDURE — 1200000000 HC SEMI PRIVATE

## 2023-03-03 PROCEDURE — 92610 EVALUATE SWALLOWING FUNCTION: CPT

## 2023-03-03 PROCEDURE — 99285 EMERGENCY DEPT VISIT HI MDM: CPT

## 2023-03-03 PROCEDURE — 80061 LIPID PANEL: CPT

## 2023-03-03 PROCEDURE — 70450 CT HEAD/BRAIN W/O DYE: CPT

## 2023-03-03 PROCEDURE — 81003 URINALYSIS AUTO W/O SCOPE: CPT

## 2023-03-03 PROCEDURE — 6370000000 HC RX 637 (ALT 250 FOR IP): Performed by: STUDENT IN AN ORGANIZED HEALTH CARE EDUCATION/TRAINING PROGRAM

## 2023-03-03 RX ORDER — CLOPIDOGREL BISULFATE 75 MG/1
300 TABLET ORAL ONCE
Status: COMPLETED | OUTPATIENT
Start: 2023-03-03 | End: 2023-03-03

## 2023-03-03 RX ORDER — ONDANSETRON 2 MG/ML
4 INJECTION INTRAMUSCULAR; INTRAVENOUS EVERY 6 HOURS PRN
Status: DISCONTINUED | OUTPATIENT
Start: 2023-03-03 | End: 2023-03-05 | Stop reason: HOSPADM

## 2023-03-03 RX ORDER — DONEPEZIL HYDROCHLORIDE 5 MG/1
5 TABLET, FILM COATED ORAL DAILY
COMMUNITY
Start: 2022-12-22

## 2023-03-03 RX ORDER — ATORVASTATIN CALCIUM 40 MG/1
40 TABLET, FILM COATED ORAL NIGHTLY
Status: DISCONTINUED | OUTPATIENT
Start: 2023-03-03 | End: 2023-03-05 | Stop reason: HOSPADM

## 2023-03-03 RX ORDER — AMLODIPINE BESYLATE 10 MG/1
10 TABLET ORAL DAILY
Status: ON HOLD | COMMUNITY
Start: 2021-04-27 | End: 2023-03-05 | Stop reason: HOSPADM

## 2023-03-03 RX ORDER — TAMSULOSIN HYDROCHLORIDE 0.4 MG/1
0.4 CAPSULE ORAL DAILY
COMMUNITY
Start: 2023-02-07

## 2023-03-03 RX ORDER — CLOPIDOGREL BISULFATE 75 MG/1
75 TABLET ORAL DAILY
Status: DISCONTINUED | OUTPATIENT
Start: 2023-03-04 | End: 2023-03-05 | Stop reason: HOSPADM

## 2023-03-03 RX ORDER — ACETAMINOPHEN 325 MG/1
650 TABLET ORAL EVERY 6 HOURS PRN
Status: DISCONTINUED | OUTPATIENT
Start: 2023-03-03 | End: 2023-03-05 | Stop reason: HOSPADM

## 2023-03-03 RX ORDER — MAGNESIUM SULFATE IN WATER 40 MG/ML
2000 INJECTION, SOLUTION INTRAVENOUS PRN
Status: DISCONTINUED | OUTPATIENT
Start: 2023-03-03 | End: 2023-03-05 | Stop reason: HOSPADM

## 2023-03-03 RX ORDER — LEVOTHYROXINE SODIUM 0.05 MG/1
50 TABLET ORAL DAILY
Status: DISCONTINUED | OUTPATIENT
Start: 2023-03-03 | End: 2023-03-05 | Stop reason: HOSPADM

## 2023-03-03 RX ORDER — ENOXAPARIN SODIUM 100 MG/ML
40 INJECTION SUBCUTANEOUS EVERY EVENING
Status: DISCONTINUED | OUTPATIENT
Start: 2023-03-04 | End: 2023-03-05 | Stop reason: HOSPADM

## 2023-03-03 RX ORDER — TAMSULOSIN HYDROCHLORIDE 0.4 MG/1
0.4 CAPSULE ORAL DAILY
Status: DISCONTINUED | OUTPATIENT
Start: 2023-03-03 | End: 2023-03-05 | Stop reason: HOSPADM

## 2023-03-03 RX ORDER — ASPIRIN 81 MG/1
81 TABLET, CHEWABLE ORAL DAILY
Status: DISCONTINUED | OUTPATIENT
Start: 2023-03-04 | End: 2023-03-05 | Stop reason: HOSPADM

## 2023-03-03 RX ORDER — ONDANSETRON 4 MG/1
4 TABLET, ORALLY DISINTEGRATING ORAL EVERY 8 HOURS PRN
Status: DISCONTINUED | OUTPATIENT
Start: 2023-03-03 | End: 2023-03-05 | Stop reason: HOSPADM

## 2023-03-03 RX ORDER — SODIUM CHLORIDE, SODIUM LACTATE, POTASSIUM CHLORIDE, CALCIUM CHLORIDE 600; 310; 30; 20 MG/100ML; MG/100ML; MG/100ML; MG/100ML
INJECTION, SOLUTION INTRAVENOUS CONTINUOUS
Status: DISPENSED | OUTPATIENT
Start: 2023-03-03 | End: 2023-03-03

## 2023-03-03 RX ORDER — FENOFIBRATE 160 MG/1
160 TABLET ORAL DAILY
Status: DISCONTINUED | OUTPATIENT
Start: 2023-03-03 | End: 2023-03-05 | Stop reason: HOSPADM

## 2023-03-03 RX ORDER — DONEPEZIL HYDROCHLORIDE 5 MG/1
5 TABLET, FILM COATED ORAL DAILY
Status: CANCELLED | OUTPATIENT
Start: 2023-03-03

## 2023-03-03 RX ORDER — ACETAMINOPHEN 650 MG/1
650 SUPPOSITORY RECTAL EVERY 6 HOURS PRN
Status: DISCONTINUED | OUTPATIENT
Start: 2023-03-03 | End: 2023-03-05 | Stop reason: HOSPADM

## 2023-03-03 RX ORDER — SODIUM CHLORIDE 0.9 % (FLUSH) 0.9 %
5-40 SYRINGE (ML) INJECTION EVERY 12 HOURS SCHEDULED
Status: DISCONTINUED | OUTPATIENT
Start: 2023-03-03 | End: 2023-03-05 | Stop reason: HOSPADM

## 2023-03-03 RX ORDER — POTASSIUM CHLORIDE 7.45 MG/ML
10 INJECTION INTRAVENOUS PRN
Status: DISCONTINUED | OUTPATIENT
Start: 2023-03-03 | End: 2023-03-05 | Stop reason: HOSPADM

## 2023-03-03 RX ORDER — SODIUM CHLORIDE 0.9 % (FLUSH) 0.9 %
5-40 SYRINGE (ML) INJECTION PRN
Status: DISCONTINUED | OUTPATIENT
Start: 2023-03-03 | End: 2023-03-05 | Stop reason: HOSPADM

## 2023-03-03 RX ORDER — SODIUM CHLORIDE 9 MG/ML
INJECTION, SOLUTION INTRAVENOUS PRN
Status: DISCONTINUED | OUTPATIENT
Start: 2023-03-03 | End: 2023-03-05 | Stop reason: HOSPADM

## 2023-03-03 RX ORDER — ASPIRIN 81 MG/1
81 TABLET, CHEWABLE ORAL ONCE
Status: COMPLETED | OUTPATIENT
Start: 2023-03-03 | End: 2023-03-03

## 2023-03-03 RX ADMIN — FENOFIBRATE 160 MG: 160 TABLET ORAL at 10:50

## 2023-03-03 RX ADMIN — ASPIRIN 81 MG: 81 TABLET, CHEWABLE ORAL at 05:18

## 2023-03-03 RX ADMIN — SODIUM CHLORIDE, POTASSIUM CHLORIDE, SODIUM LACTATE AND CALCIUM CHLORIDE: 600; 310; 30; 20 INJECTION, SOLUTION INTRAVENOUS at 10:50

## 2023-03-03 RX ADMIN — IOPAMIDOL 75 ML: 755 INJECTION, SOLUTION INTRAVENOUS at 04:09

## 2023-03-03 RX ADMIN — ATORVASTATIN CALCIUM 40 MG: 40 TABLET, FILM COATED ORAL at 19:42

## 2023-03-03 RX ADMIN — SODIUM CHLORIDE 500 MG: 9 INJECTION, SOLUTION INTRAVENOUS at 19:46

## 2023-03-03 RX ADMIN — LEVOTHYROXINE SODIUM 50 MCG: 50 TABLET ORAL at 10:50

## 2023-03-03 RX ADMIN — GADOTERIDOL 15 ML: 279.3 INJECTION, SOLUTION INTRAVENOUS at 16:48

## 2023-03-03 RX ADMIN — TAMSULOSIN HYDROCHLORIDE 0.4 MG: 0.4 CAPSULE ORAL at 10:50

## 2023-03-03 RX ADMIN — SODIUM CHLORIDE, PRESERVATIVE FREE 10 ML: 5 INJECTION INTRAVENOUS at 19:42

## 2023-03-03 RX ADMIN — SODIUM CHLORIDE, PRESERVATIVE FREE 10 ML: 5 INJECTION INTRAVENOUS at 10:50

## 2023-03-03 RX ADMIN — SODIUM CHLORIDE 1500 MG: 9 INJECTION, SOLUTION INTRAVENOUS at 05:24

## 2023-03-03 RX ADMIN — CLOPIDOGREL BISULFATE 300 MG: 75 TABLET ORAL at 05:18

## 2023-03-03 ASSESSMENT — PAIN - FUNCTIONAL ASSESSMENT: PAIN_FUNCTIONAL_ASSESSMENT: NONE - DENIES PAIN

## 2023-03-03 NOTE — PROGRESS NOTES
Patient transferred to inpatient. Report called to CHARLES Andrews. Family at bedside and aware of transfer.

## 2023-03-03 NOTE — H&P
History and Physical      Name:  Sterling Pritchett /Age/Sex: 1945  (78 y.o. male)   MRN & CSN:  [id number] & [id number] Encounter Date/Time: 3/3/2023 6:02 AM EST   Location:   PCP: David Em MD       Hospital Day: 1    Assessment and Plan:     Patient is a 44-year-old male who presented with seizures. # Seizure  - Presented with seizures with postictal state during sleep. LKW 2300 3/. No previous hx of seizures. No injuries or falls. EMS noticed left-sided facial droop and weakness on their evaluation which improved prior to arrival.   - In ED, CTH negative, CTA H/N showed 55% YINKA and 16% LICA stenosis. NIH 1. [facility] telestroke consulted, no thrombolytic therapy recommended due to resolution of symptoms.  - On my evaluation, having no weakness or numbness.   - Started on ASA and Plavix (with LD). Increased home statin. Hold off antihypertensive medications for now. - Neurology consulted, follow-up. Possible TIA. - PT/OT/ST consulted, follow-up. - MRI and TTE pending. A1c and lipids pending.   - NPO pending swallow evaluation. Fall precautions, neuro checks q4h. Telemetry. # ? Dementia  - No MMSE/MoCA/SLUMS on file. Remains independent with IADLs.   - On Aricept 5 mg, hold for borderline bradycardia. Not indicated for MCI. # Essential hypertension  - Hold home Norvasc and Bisoprolol-HCTZ for now. # Acquired hypothyroidism  - Continue Synthroid. - Follow-up repeat TSH. Checklist:  Advanced directive: full  Catheter: none  DVT ppx: Lovenox  Nutrition/Diet: NPO  PT/OT:  ordered    Disposition: patient requires continued admission due to seizures. MDM: moderate. History of Present Illness:     Chief Complaint: seizures    Patient is a 44-year-old male with a PMHx of HTN, HLD and BPH who presented to the ED with seizures with postictal state during sleep. LKW 2300 3/. No previous hx of seizures. No injuries or falls.  EMS noticed left-sided facial droop and weakness on their evaluation which improved prior to arrival. No fevers/chills, however, recently had suspected sinusitis. No CP, palpitations, SOB or other complaints. ROS:     Ten point ROS reviewed negative, unless as noted above. Objective:     No intake or output data in the 24 hours ending 03/03/23 0602     Vitals:   Vitals:    03/03/23 0411 03/03/23 0436 03/03/23 0500 03/03/23 0530   BP: 124/64 130/67 123/64 125/69   Pulse: 66 63 62 58   Resp:  14 15 14   Temp: 98.3 °F (36.8 °C)      TempSrc: Oral      SpO2: 96% 97%     Weight: 162 lb (73.5 kg) 162 lb (73.5 kg)     Height:  5' 11\" (1.803 m)       BMI: Body mass index is 22.59 kg/m². General: Awake. WDWN. HEENT: PERRLA. Vision grossly intact. Hearing grossly intact. Oropharynx clear. Neck: Supple. No JVD. CV: RRR. NL S1/S2. No BLE edema. Pulm: NL effort on RA. CTAB. GI: +BS x4. Soft. NT/ND. : No CVA or suprapubic tenderness. No Mo catheter. Skin: Intact. Normal coloration, warm, dry. MSK: No gross joint deformities. Full ROM. Neuro: A&Ox4, CNs grossly intact, face symmetrical, no obvious droop, speech clear and coherent, no pronator drift, rapid and repetitive movements are intact, no significant finger-to-nose dysmetria, sensation intact to light touch. No focal deficit. Bilateral UEs and LEs 5/5 throughout. Psych: Good judgement and reason. Past History: PMHx:   Past Medical History:   Diagnosis Date    Hyperlipidemia     Hypertension        PSHx: No past surgical history on file. Allergies: No Known Allergies    FHx: family history includes Coronary Art Dis in his father; Hypertension in his father; Tuberculosis in his mother.     SHx:   Social History     Socioeconomic History    Marital status:    Tobacco Use    Smoking status: Never    Smokeless tobacco: Never   Vaping Use    Vaping Use: Never used   Substance and Sexual Activity    Alcohol use: Never    Drug use: Never       Medications Prior to Admission     Prior to Admission medications    Medication Sig Start Date End Date Taking? Authorizing Provider   amLODIPine (NORVASC) 10 MG tablet Take 10 mg by mouth daily 4/27/21  Yes Historical Provider, MD   donepezil (ARICEPT) 5 MG tablet Take 5 mg by mouth daily 12/22/22   Historical Provider, MD   tamsulosin (FLOMAX) 0.4 MG capsule Take 0.4 mg by mouth daily 2/7/23   Historical Provider, MD   lovastatin (MEVACOR) 40 MG tablet Take 60 mg by mouth nightly    Historical Provider, MD   fenofibrate (TRIGLIDE) 160 MG tablet Take 160 mg by mouth daily    Historical Provider, MD   bisoprolol-hydroCHLOROthiazide (ZIAC) 10-6.25 MG per tablet Take 1 tablet by mouth daily    Historical Provider, MD   levothyroxine (SYNTHROID) 50 MCG tablet Take 1 tablet by mouth daily 2/4/22   Historical Provider, MD       Data:     CBC:   Recent Labs     03/03/23  0354   WBC 12.9*   HGB 12.2*      MCV 90.3   RDW 13.5   LYMPHOPCT 34.0   MONOPCT 11.4*   BASOPCT 0.7   MONOSABS 1.5   LYMPHSABS 4.4   EOSABS 0.2   BASOSABS 0.1     CMP:    Recent Labs     03/03/23  0354 03/03/23  0359   *  --    K 4.0  --    CL 97*  --    CO2 22  --    BUN 18  --    CREATININE 1.2  --    GLUCOSE 135* 130   CALCIUM 9.5  --      Lipids: No results found for: CHOL, HDL, TRIG  Hemoglobin A1C: No results found for: LABA1C  TSH: No results found for: TSH  Troponin:   Lab Results   Component Value Date/Time    TROPONINT <0.010 03/03/2023 03:54 AM    TROPONINT <0.010 04/13/2022 07:10 AM    TROPONINT <0.010 04/13/2022 01:39 AM     BNP: No results for input(s): PROBNP in the last 72 hours. Lactic Acid: No results for input(s): LACTA in the last 72 hours.   UA:  Lab Results   Component Value Date/Time    NITRU NEGATIVE 04/12/2022 06:35 PM    COLORU YELLOW 04/12/2022 06:35 PM    WBCUA 110 04/12/2022 06:35 PM    RBCUA 4 04/12/2022 06:35 PM    MUCUS RARE 04/12/2022 06:35 PM    BACTERIA MANY 04/12/2022 06:35 PM    CLARITYU CLOUDY 04/12/2022 06:35 PM    SPECGRAV 1.025 04/12/2022 06:35 PM    LEUKOCYTESUR SMALL 04/12/2022 06:35 PM    UROBILINOGEN NORMAL 04/12/2022 06:35 PM    BILIRUBINUR NEGATIVE 04/12/2022 06:35 PM    BLOODU SMALL 04/12/2022 06:35 PM    KETUA NEGATIVE 04/12/2022 06:35 PM     Urine Cultures: No results found for: LABURIN  Blood Cultures: No results found for: BC  No results found for: BLOODCULT2  Organism: No results found for: [facility]    Radiology results:  CTA HEAD NECK W CONTRAST   Preliminary Result   Evidence of 50% stenosis due to calcified plaques at the origin of the right   internal carotid artery. Evidence of severe stenosis, which is about 80% stenosis due to prominent   calcified plaques at the origin of the left internal carotid artery. In the rest of bilateral carotid arterial systems and vertebral arteries in   neck there is no additional significant stenosis or dissection. Bilateral   vertebral arteries are of small size. No evidence of compromise in the and intracranial anterior circulation of   bilateral internal carotid arteries. No evidence of compromise in the vertebrobasilar arterial circulation. No evidence of dural sinus thrombosis. No definable focal abnormality or acute process in the brain. CT HEAD WO CONTRAST   Final Result   Addendum (preliminary) 1 of 1   ADDENDUM:   Findings were discussed with Dr. Dima Zepeda at 4:42 a.m. on 03/03/2023 by our    [address] [place] [address]. Final   1. No acute intracranial ischemia or hemorrhage is identified. 2.  Generalized age-related cortical atrophy, with intracranial   atherosclerosis and CT findings suggestive of chronic microvascular ischemic   change.          MRI BRAIN WO CONTRAST    (Results Pending)       Medications:     Medications:    [START ON 3/4/2023] aspirin  81 mg Oral Daily    [START ON 3/4/2023] clopidogrel  75 mg Oral Daily    [START ON 3/4/2023] levETIRAcetam  500 mg IntraVENous Q12H        Infusions:       PRN Meds:        Orville Mann MD  03/03/23 6:02 AM

## 2023-03-03 NOTE — ED TRIAGE NOTES
Patient arrives to ED on stretcher by EMS with c/o new onset of seizure followed by left sided deficits. Pt is alert, oriented x4, no complaints of headache or numbness. Symptoms resolved, stroke alert called at 0346 after Dr. Jarrod Pendleton NIHSS assessment.

## 2023-03-03 NOTE — PROGRESS NOTES
Speech Language Pathology  Facility/Department: [facility]   CLINICAL BEDSIDE SWALLOW EVALUATION    NAME: Lon Durham  : 1945  MRN: [id number]    IMPRESSIONS: Lon Durham was referred for bedside swallow evaluation after being [facility] to [facility] with seizure. Head CT indicated no acute intracranial ischemia or hemorrhage is identified. MRI pending. Medical hx includes HTN, HLD and BPH. No prior hx of dysphagia. Pt was seen for evaluation seated upright in bed, alert, cooperative, pleasant. Oral mechanism was WNL with no asymmetry, pt is edentulous and reports he has dentures however does not wear them. Pt presented PO trials of thin via cup/straw, puree, and regular solids. Oral stage was [facility] characterized by intact labial seal, prolonged/adequate mastication, adequate AP transfer and oral clearance. Pharyngeal stage was WNL with timely swallow initiation and laryngeal elevation. No s/s of aspiration noted across all trials. Receptive/expressive language, motor speech and cognition screened and judged to be WNL. Recommend initiate regular diet and thin liquids. No further acute SLP needs identified at this time. Results/recommendations d/w pt and nurse. ADMISSION DATE: 3/3/2023  ADMITTING DIAGNOSIS: has Acquired hypothyroidism; Benign essential hypertension; Mixed hyperlipidemia; Prediabetes; ANTHONY (acute kidney injury) ([address] Sepsis ([address]  Seizures ([address] and Seizure ([address] on their problem list.  ONSET DATE: this admission    Recent Chest Xray/CT of Chest: see chart    Date of Eval: 3/3/2023  Evaluating Therapist: MYLENE Gutierrez    Current Diet level:  Current Diet : NPO      Primary Complaint       Pain:  Pain Assessment  Pain Assessment: None - Denies Pain    Reason for Referral  Lon Durham was referred for a bedside swallow evaluation to assess the efficiency of his swallow function, identify signs and symptoms of aspiration and make recommendations regarding safe dietary consistencies, effective compensatory strategies, and safe eating environment. Impression  Dysphagia Diagnosis: Swallow function appears WFL;No clinical indicators of dysphagia  Dysphagia Outcome Severity Scale: Level 6: Within functional limits/Modified independence     Treatment Plan  Requires SLP Intervention: No             Recommended Diet and Intervention                   Compensatory Swallowing Strategies       Treatment/Goals       General  Chart Reviewed: No  Behavior/Cognition: Alert; Cooperative;Pleasant mood  Respiratory Status: Room air  Follows Directions: Simple  Dentition: Edentulous  Patient Positioning: Upright in bed  Baseline Vocal Quality: Normal  Prior Dysphagia History: none known prior to admission  Consistencies Administered: Regular;Pureed; Thin           Vision/Hearing  Vision  Vision: Within Functional Limits  Hearing  Hearing: Within functional limits    Oral Motor Deficits  Oral/Motor  Oral Hygiene: Moist    Oral Phase Dysfunction  Oral Phase  Oral Phase: WNL     Indicators of Pharyngeal Phase Dysfunction   Pharyngeal Phase   Pharyngeal Phase: appears to be WNL    Prognosis       Education  Patient Education: results/recommendations  Patient Education Response: Verbalizes understanding;Demonstrated understanding             Therapy Time  SLP Individual Minutes  Time In: 0915  Time Out: 0930  Minutes: [address]  3/3/2023 9:47 AM

## 2023-03-03 NOTE — PROGRESS NOTES
03/03/23 0956   Encounter Summary   Encounter Overview/Reason  Initial Encounter   Service Provided For: Patient and family together   Referral/Consult From: [facility]   Support System Children   Last Encounter  03/03/23   Complexity of Encounter Low   Begin Time 0950   End Time  0957   Total Time Calculated 7 min   Encounter    Type Initial Screen/Assessment   Spiritual/Emotional needs   Type Spiritual Support   Assessment/Intervention/Outcome   Assessment Calm;Peaceful   Intervention Active listening;Nurtured Hope;Sustaining Presence/Ministry of presence; Discussed belief system/Restorationism practices/asaf;Discussed relationship with God   Outcome Engaged in conversation;Encouraged;Expressed Gratitude   Plan and Referrals   Plan/Referrals Continue Support (comment)  (as needed)

## 2023-03-03 NOTE — CONSULTS
Neurology Service Consult Note  [address]   Patient Name: Eulogio Wen  : 1945        Subjective:   Reason for consult: Seizure  78 y.o. male with history of HTN, HLD, dementia presenting to [place] [id number] with seizure-like activity. Patient was staying with his significant other who woke to find him with generalized shaking. She called EMS who on arrival noted some left-sided facial weakness on their initial evaluation. Patient was confused and lethargic initially but once in the emergency department had gradual return to baseline. Patient has recently been started on Aricept for suspected dementia. He has not been evaluated by neurology in the past.  When asked if he had history of seizure, patient states that he had 1 \"before when they started my memory medication\". Daughter who is at bedside states that she has no knowledge of patient having any seizure in the past.  She goes on to tell me that the patient's memory problems have been gradually increasing. He has been having some behaviors such as reporting that he has been to the urgent care because he was ill and the daughter states that later she finds out that it was his significant other who was ill and went to the urgent care. Overall he is an unreliable historian. Patient states that he does remember feeling \"fuzzy headed\" this morning. He says that the shaking woke him up. He denies headache, vision change, focal weakness or sensory change today. He feels back to his baseline. There is no evidence for bruising of the tongue, patient is edentulous. It is difficult to determine if he had any bladder incontinence as he has some incontinence at baseline and wears a pad for this. Patient was evaluated by [facility] telestroke team who recommended loading dose of Keppra.   They did not feel that patient was experiencing acute stroke during exam.    Past Medical History:   Diagnosis Date    Hyperlipidemia Hypertension     :   No past surgical history on file.   Medications:  Scheduled Meds:   fenofibrate  160 mg Oral Daily    levothyroxine  50 mcg Oral Daily    tamsulosin  0.4 mg Oral Daily    atorvastatin  40 mg Oral Nightly    sodium chloride flush  5-40 mL IntraVENous 2 times per day    [START ON 3/4/2023] enoxaparin  40 mg SubCUTAneous QPM    [START ON 3/4/2023] aspirin  81 mg Oral Daily    [START ON 3/4/2023] clopidogrel  75 mg Oral Daily    levETIRAcetam  500 mg IntraVENous Q12H     Continuous Infusions:   lactated ringers IV soln 100 mL/hr at 03/03/23 1050    sodium chloride       PRN Meds:.sodium chloride flush, sodium chloride, potassium chloride, magnesium sulfate, ondansetron **OR** ondansetron, acetaminophen **OR** acetaminophen    No Known Allergies  Social History     Socioeconomic History    Marital status:      Spouse name: Not on file    Number of children: Not on file    Years of education: Not on file    Highest education level: Not on file   Occupational History    Not on file   Tobacco Use    Smoking status: Never    Smokeless tobacco: Never   Vaping Use    Vaping Use: Never used   Substance and Sexual Activity    Alcohol use: Never    Drug use: Never    Sexual activity: Not on file   Other Topics Concern    Not on file   Social History Narrative    Not on file     Social Determinants of Health     Financial Resource Strain: Not on file   Food Insecurity: Not on file   Transportation Needs: Not on file   Physical Activity: Not on file   Stress: Not on file   Social Connections: Not on file   Intimate Partner Violence: Not on file   Housing Stability: Not on file      Family History   Problem Relation Age of Onset    Tuberculosis Mother     Coronary Art Dis Father     Hypertension Father          ROS (10 systems)  In addition to that documented in the HPI above, the additional ROS was obtained:  Constitutional: Denies fevers or chills  Eyes: Denies vision changes  ENMT: Denies sore throat  CV: Denies chest pain  Resp: Denies SOB  GI: Denies vomiting or diarrhea  : Denies painful urination  MSK: Denies recent trauma  Skin: Denies new rashes  Neuro: Denies new numbness or tingling or weakness  Endocrine: Denies unexpected weight loss  Heme: Denies bleeding disorders    Physical Exam:      Wt Readings from Last 3 Encounters:   03/03/23 162 lb (73.5 kg)   03/03/23 160 lb 15 oz (73 kg)   04/15/22 195 lb (88.5 kg)     Temp Readings from Last 3 Encounters:   03/03/23 98 °F (36.7 °C) (Oral)   04/16/22 98.4 °F (36.9 °C) (Oral)     BP Readings from Last 3 Encounters:   03/03/23 (!) 124/59   04/16/22 (!) 110/57     Pulse Readings from Last 3 Encounters:   03/03/23 59   04/16/22 67        Gen: A&O x 4, NAD, cooperative  HEENT: NC/AT, EOMI, PERRL, mmm,  neck supple, no meningeal signs  Heart: NSR  Lungs: Respirations even and unlabored  Ext: no edema, no calf tenderness b/l  Psych: normal mood and affect, confusion  Skin: no rashes or lesions    NEUROLOGIC EXAM:    Mental Status: A&O to self, location, month and year, NAD, speech clear, language fluent, repetition and naming intact, follows commands appropriately, unable to spell world backwards, unable to do simple math problems    Cranial Nerve Exam:   CN II-XII: PERRL, VFF, no nystagmus, no gaze paresis, sensation V1-V3 intact b/l, muscles of facial expression symmetric; hearing intact to conversational tone, palate elevates symmetrically, shoulder elevation symmetric and tongue protrudes midline with movement side to side.     Motor Exam:       Strength 5/5 UE's/LE's b/l  Tone and bulk normal   No pronator drift    Deep Tendon Reflexes: 2/4 biceps, triceps, brachioradialis, patellar, and achilles b/l; flexor plantar responses b/l    Sensation: Intact light touch/pinprick/vibration UE's/LE's b/l    Coordination/Cerebellum:       Tremors--none      Rapidly alternating movements: no dysdiadochokinesia b/l                Heel-to-Shin: no dysmetria b/l Finger-to-Nose: no dysmetria b/l    Gait and stance:      Gait: deferred      LABS:     Recent Labs     03/03/23  0354 03/03/23  0359 03/03/23  0633   WBC 12.9*  --   --    *  --   --    K 4.0  --   --    CL 97*  --   --    CO2 22  --   --    BUN 18  --   --    CREATININE 1.2  --   --    GLUCOSE 135* 130  --    INR  --   --  1.14            IMAGING:    CT head w/o contrast:  Impression   1. No acute intracranial ischemia or hemorrhage is identified. 2.  Generalized age-related cortical atrophy, with intracranial   atherosclerosis and CT findings suggestive of chronic microvascular ischemic   change. CTA head and neck:     Impression   Evidence of 50% stenosis due to calcified plaques at the origin of the right   internal carotid artery. Evidence of severe stenosis, which is about 80% stenosis due to prominent   calcified plaques at the origin of the left internal carotid artery. In the rest of bilateral carotid arterial systems and vertebral arteries in   neck there is no additional significant stenosis or dissection. Bilateral   vertebral arteries are of small size. No evidence of compromise in the and intracranial anterior circulation of   bilateral internal carotid arteries. No evidence of compromise in the vertebrobasilar arterial circulation. No evidence of dural sinus thrombosis. No definable focal abnormality or acute process in the brain. MRI brain w/wo contrast:  Pending    Routine EEG:  Completed-pending final read    All imaging was personally reviewed   ASSESSMENT/PLAN:   79-year-old male presenting with seizure like episode followed by facial weakness concerning for stroke. Patient is alert and oriented x4 today but does have underlying cognitive deficits suspect secondary to dementia. He has a difficult time with simple math and is unable to spell world backwards during the exam.  Face is symmetric, tongue is midline.   Speech is slurred due to patient being edentulous, language is fluent. Strength and sensation are intact in the upper and lower extremities. There is no evidence for tongue wound consistent with seizure. Episode of generalized shaking consistent with seizure. This was followed by facial weakness and confusion likely secondary to postictal state and Yan's paralysis. Neuroimaging as above  CTA with 80% stenosis in the left ICA, 50% right ICA  MRI brain w/wo contrast pending  Routine EEG-has been completed, pending final read  Continue Keppra 500 mg twice daily pending EEG results  Patient received Keppra load in the ED  Maintain seizure precautions  Continue DAPT with aspirin and Plavix due to bilateral ICA stenosis  Continue atorvastatin for secondary stroke prevention    TSH WNL, A1c pending  PT/OT as recommended  Do not drive until cleared by your neurologist. No tub baths or swimming. No operating heavy or dangerous equipment. Do not use ladders. Further recommendations pending EEG and MRI results    Patient was discussed with attending neurologist Dr. Kendal Whittaker         Thank you for allowing us to participate in the care of your patient. If there are any questions regarding evaluation please feel free to contact us. JUNO Avalos CNP, 3/3/2023     Attending Note:  I have rounded on this patient with Tierra INTERIANO. I have reviewed the chart and we have discussed this case in detail. The patient was seen and examined by myself. Pertinent labs and imaging have been personally reviewed. Our findings and impressions were discussed with the patient. I concur with the Nurse Practioner's assessment and plan.     Richar Kunz DO

## 2023-03-03 NOTE — ED NOTES
ED TO INPATIENT SBAR HANDOFF    Patient Name: Sara López   :  1945  78 y.o. MRN:  [id number]  Preferred Name  James Tilley  ED Room #:  TR02/02TR-02  Family/Caregiver Present yes   Restraints no   Sitter no   Sepsis Risk Score Sepsis Risk Score: 1.47    Situation  Code Status: Prior No additional code details. Allergies: Patient has no known allergies. Weight: Patient Vitals for the past 96 hrs (Last 3 readings):   Weight   23 0436 162 lb (73.5 kg)   23 0411 162 lb (73.5 kg)     Arrived from: home  Chief Complaint:   Chief Complaint   Patient presents with    Seizures     New onset, full body seizure     Hospital Problem/Diagnosis:  Principal Problem:    Seizures ([address]  Resolved Problems:    * No resolved hospital problems. *    Imaging:   CTA HEAD NECK W CONTRAST   Preliminary Result   Evidence of 50% stenosis due to calcified plaques at the origin of the right   internal carotid artery. Evidence of severe stenosis, which is about 80% stenosis due to prominent   calcified plaques at the origin of the left internal carotid artery. In the rest of bilateral carotid arterial systems and vertebral arteries in   neck there is no additional significant stenosis or dissection. Bilateral   vertebral arteries are of small size. No evidence of compromise in the and intracranial anterior circulation of   bilateral internal carotid arteries. No evidence of compromise in the vertebrobasilar arterial circulation. No evidence of dural sinus thrombosis. No definable focal abnormality or acute process in the brain. CT HEAD WO CONTRAST   Final Result   Addendum (preliminary)    ADDENDUM:   Findings were discussed with Dr. Xochitl Olmos at 4:42 a.m. on 2023 by our    [address] [place] [address]. Final   1. No acute intracranial ischemia or hemorrhage is identified.    2.  Generalized age-related cortical atrophy, with intracranial   atherosclerosis and CT findings suggestive of chronic microvascular ischemic   change. MRI BRAIN WO CONTRAST    (Results Pending)     Abnormal labs:   Abnormal Labs Reviewed   BASIC METABOLIC PANEL - Abnormal; Notable for the following components:       Result Value    Sodium 133 (*)     Chloride 97 (*)     Glucose 135 (*)     All other components within normal limits   CBC WITH AUTO DIFFERENTIAL - Abnormal; Notable for the following components:    WBC 12.9 (*)     RBC 4.01 (*)     Hemoglobin 12.2 (*)     Hematocrit 36.2 (*)     Monocytes % 11.4 (*)     Immature Neutrophil % 0.5 (*)     All other components within normal limits   POCT GLUCOSE - Abnormal; Notable for the following components:    POC Glucose 130 (*)     All other components within normal limits     Critical values: no     Abnormal Assessment Findings:   New  onset of seizure activity, left sided deficit in upper extremity    Background  History:   Past Medical History:   Diagnosis Date    Hyperlipidemia     Hypertension        Assessment    Vitals/MEWS: MEWS Score: 0  Level of Consciousness: Alert (0)   Vitals:    03/03/23 0500 03/03/23 0530 03/03/23 0600 03/03/23 0624   BP: 123/64 125/69 126/75 128/71   Pulse: 62 58 59 59   Resp: 15 14 14 13   Temp:    98.1 °F (36.7 °C)   TempSrc:    Oral   SpO2:    99%   Weight:       Height:         FiO2 (%): . O2 Flow Rate: O2 Device: None (Room air)    Cardiac Rhythm: .  Pain Assessment: 0 [x] Verbal [] Merilynn Pickett Scale  Pain Scale: Pain Assessment  Pain Assessment: None - Denies Pain  Last documented pain score (0-10 scale)    Last documented pain medication administered: NA  Mental Status: oriented, alert, coherent, logical, thought processes intact, and able to concentrate and follow conversation  NIH Score: NIH NIH Stroke Scale  Interval: Baseline  Level of Consciousness (1a): Alert  LOC Questions (1b):  Answers both correctly  Best Gaze (2): Normal  Visual (3): No visual loss  Facial Palsy (4): Normal symmetrical movement  Motor Arm, Left (5a): No drift  Motor Arm, Right (5b): No drift  Motor Leg, Left (6a): No drift  Motor Leg, Right (6b): No drift  Limb Ataxia (7): (!) Present in one limb  Sensory (8): Normal  Best Language (9): Mild to moderate aphasia  Dysarthria (10): Normal  Extinction and Inattention (11): (!) Visual, tactile, auditory, spatial, or personal inattention   C-SSRS: Risk of Suicide: No Risk  Bedside swallow:    Chun Coma Scale (GCS): [place] Coma Scale  Eye Opening: Spontaneous  Best Verbal Response: Oriented  Best Motor Response: Obeys commands  [place] Coma Scale Score: 15  Active LDA's:   Peripheral IV 03/03/23 Left Antecubital (Active)     PO Status: Regular  Pertinent or High Risk Medications/Drips: no   If Yes, please provide details: na  Pending Blood Product Administration: no     You may also review the ED PT Care Timeline found under the Summary Nursing Index tab. Recommendation    Pending orders In patient orders  Plan for Discharge (if known): Additional Comments:   Patient came to ED this morning with concerns of new onset of seizure like activity. After event, pt notified left sided deficits which resolved upon arrival to ED. However during NIHSS assessment, it was determined pt had loss upper left extremity dexterity.      If any further questions, [place] [phone]    Electronically signed by: Electronically signed by Uday Yu RN on 3/3/2023 at 6:27 AM       Uday Yu RN  03/03/23 [id number]

## 2023-03-03 NOTE — PROGRESS NOTES
Routine Inpatient EEG completed 03/03/2023. EEG is now awaiting interpretation. Physician has been notified via [facility].

## 2023-03-03 NOTE — ED PROVIDER NOTES
Triage Chief Complaint:    Seizures (New onset, full body seizure)    BRITT Goodman is a 78 y.o. male that presents for evaluation of a seizure. The patient does not have any history of epilepsy. No known history of seizure disorder in the family. No recent injuries or falls. Was feeling well yesterday. Patient has not had any numbness tingling or weakness but medics did report that he had left-sided facial droop and weakness for them upon their initial evaluation. Patient's symptoms had significantly proved at the time that they arrived here. He did not report any chest pain abdominal pain nausea vomiting diarrhea or constipation. He had a headache earlier but that resolved. No neck pain or stiffness. No history of regular alcohol use. No illicit drug use. History from : Patient and EMS    Limitations to history : None    ROS:  10 systems reviewed and negative except as above. Past Medical History:   Diagnosis Date    Hyperlipidemia     Hypertension      No past surgical history on file.   Family History   Problem Relation Age of Onset    Tuberculosis Mother     Coronary Art Dis Father     Hypertension Father      Social History     Socioeconomic History    Marital status:      Spouse name: Not on file    Number of children: Not on file    Years of education: Not on file    Highest education level: Not on file   Occupational History    Not on file   Tobacco Use    Smoking status: Never    Smokeless tobacco: Never   Vaping Use    Vaping Use: Never used   Substance and Sexual Activity    Alcohol use: Never    Drug use: Never    Sexual activity: Not on file   Other Topics Concern    Not on file   Social History Narrative    Not on file     Social Determinants of Health     Financial Resource Strain: Not on file   Food Insecurity: Not on file   Transportation Needs: Not on file   Physical Activity: Not on file   Stress: Not on file   Social Connections: Not on file   Intimate Partner Violence: Not on file   Housing Stability: Not on file     Current Facility-Administered Medications   Medication Dose Route Frequency Provider Last Rate Last Admin    levETIRAcetam (KEPPRA) 1,500 mg in sodium chloride 0.9 % 100 mL IVPB  1,500 mg IntraVENous Once Michael Stinson MD        clopidogrel (PLAVIX) tablet 300 mg  300 mg Oral Once Michael Stinson MD        aspirin chewable tablet 81 mg  81 mg Oral Once Michael Stinson MD         Current Outpatient Medications   Medication Sig Dispense Refill    amLODIPine (NORVASC) 10 MG tablet Take 10 mg by mouth daily      donepezil (ARICEPT) 5 MG tablet Take 5 mg by mouth daily      tamsulosin (FLOMAX) 0.4 MG capsule Take 0.4 mg by mouth daily      lovastatin (MEVACOR) 40 MG tablet Take 60 mg by mouth nightly      fenofibrate (TRIGLIDE) 160 MG tablet Take 160 mg by mouth daily      bisoprolol-hydroCHLOROthiazide (ZIAC) 10-6.25 MG per tablet Take 1 tablet by mouth daily      levothyroxine (SYNTHROID) 50 MCG tablet Take 1 tablet by mouth daily       No Known Allergies    Nursing Notes Reviewed    Physical Exam:     ED Triage Vitals [03/03/23 0411]   Enc Vitals Group      /64      Heart Rate 66      Resp       Temp 98.3 °F (36.8 °C)      Temp Source Oral      SpO2 96 %      Weight 162 lb (73.5 kg)      Height       Head Circumference       Peak Flow       Pain Score       Pain Loc       Pain Edu? Excl. in [address]? My pulse ox interpretation is - normal    General appearance:  No acute distress. Skin:  Warm. Dry. Eye:  Extraocular movements intact. PERRLA  Ears, nose, mouth and throat:  Oral mucosa moist   Neck:  Trachea midline. No meningismus  Extremity:  No swelling. Normal ROM     Heart:  Regular rate and rhythm. Perfusion:  intact  Respiratory:  Lungs clear to auscultation bilaterally. Respirations nonlabored. Abdominal:  Normal bowel sounds. Soft. Nontender. Non distended.   Back:  No CVA tenderness to palpation     Neurological:  Alert and oriented times 3. NIHSS of 1 for ataxia of the left upper extremity. Otherwise motor or sensory coordination appear to be grossly intact.           Psychiatric:  Appropriate      I have reviewed and interpreted all of the currently available lab results from this visit (if applicable):  Results for orders placed or performed during the hospital encounter of 14/03/78   Basic Metabolic Panel   Result Value Ref Range    Sodium 133 (L) 135 - 145 MMOL/L    Potassium 4.0 3.5 - 5.1 MMOL/L    Chloride 97 (L) 99 - 110 mMol/L    CO2 22 21 - 32 MMOL/L    Anion Gap 14 4 - 16    BUN 18 6 - 23 MG/DL    Creatinine 1.2 0.9 - 1.3 MG/DL    Est, Glom Filt Rate >60 >60 mL/min/1.73m2    Glucose 135 (H) 70 - 99 MG/DL    Calcium 9.5 8.3 - 10.6 MG/DL   CBC with Auto Differential   Result Value Ref Range    WBC 12.9 (H) 4.0 - 10.5 K/CU MM    RBC 4.01 (L) 4.6 - 6.2 M/CU MM    Hemoglobin 12.2 (L) 13.5 - 18.0 GM/DL    Hematocrit 36.2 (L) 42 - 52 %    MCV 90.3 78 - 100 FL    MCH 30.4 27 - 31 PG    MCHC 33.7 32.0 - 36.0 %    RDW 13.5 11.7 - 14.9 %    Platelets 654 800 - 886 K/CU MM    MPV 9.7 7.5 - 11.1 FL    Differential Type AUTOMATED DIFFERENTIAL     Segs Relative 51.5 36 - 66 %    Lymphocytes % 34.0 24 - 44 %    Monocytes % 11.4 (H) 0 - 4 %    Eosinophils % 1.9 0 - 3 %    Basophils % 0.7 0 - 1 %    Segs Absolute 6.7 K/CU MM    Lymphocytes Absolute 4.4 K/CU MM    Monocytes Absolute 1.5 K/CU MM    Eosinophils Absolute 0.2 K/CU MM    Basophils Absolute 0.1 K/CU MM    Nucleated RBC % 0.0 %    Total Nucleated RBC 0.0 K/CU MM    Total Immature Neutrophil 0.06 K/CU MM    Immature Neutrophil % 0.5 (H) 0 - 0.43 %   Troponin   Result Value Ref Range    Troponin T <0.010 <0.01 NG/ML   POCT Glucose   Result Value Ref Range    Glucose 130 mg/dL    QC OK? ok    POCT Glucose   Result Value Ref Range    POC Glucose 130 (H) 70 - 99 MG/DL   EKG 12 Lead   Result Value Ref Range    Ventricular Rate 67 BPM    Atrial Rate 67 BPM    P-R Interval 214 ms    QRS Duration 100 ms    Q-T Interval 432 ms    QTc Calculation (Bazett) 456 ms    R Axis 25 degrees    T Axis 27 degrees    Diagnosis       Sinus rhythm with 1st degree AV block  Otherwise normal ECG  When compared with ECG of 12-APR-2022 15:40,  IL interval has increased  Nonspecific T wave abnormality no longer evident in Lateral leads  QT has lengthened        Radiographs (if obtained):  [] The following radiograph was interpreted by myself in the absence of a radiologist:   [x] Radiologist's Report Reviewed:  CTA HEAD NECK W CONTRAST   Preliminary Result   Evidence of 50% stenosis due to calcified plaques at the origin of the right   internal carotid artery. Evidence of severe stenosis, which is about 80% stenosis due to prominent   calcified plaques at the origin of the left internal carotid artery. In the rest of bilateral carotid arterial systems and vertebral arteries in   neck there is no additional significant stenosis or dissection. Bilateral   vertebral arteries are of small size. No evidence of compromise in the and intracranial anterior circulation of   bilateral internal carotid arteries. No evidence of compromise in the vertebrobasilar arterial circulation. No evidence of dural sinus thrombosis. No definable focal abnormality or acute process in the brain. CT HEAD WO CONTRAST   Final Result   Addendum (preliminary) 1 of 1   ADDENDUM:   Findings were discussed with Dr. Delilah Nix at 4:42 a.m. on 03/03/2023 by our    [address] [place] [address]. Final   1. No acute intracranial ischemia or hemorrhage is identified. 2.  Generalized age-related cortical atrophy, with intracranial   atherosclerosis and CT findings suggestive of chronic microvascular ischemic   change. Procedures:  12 lead EKG per my interpretation:  Normal Sinus Rhythm  Rate: 67  QTc is  normal  There are no T wave changes appreciated. There are no ST wave changes appreciated.     Prior EKG to compare with was available and is similar    (All EKG's are interpreted by myself in the absence of a cardiologist)    Critical Care: Total critical care time today provided was 33 minutes. This excludes seperately billable procedure. Critical care time provided for potential or impending CNS compromise that required close evaluation and/or intervention with concern for patient decompensation. Chart review shows recent radiographs:  No results found. MDM:     Discussion with Other Professionals : Admitting Team Dr. Kapil Mosqueda  and Consultant Dr. Nirali Delatorre    Social Determinants: None    Records Reviewed : None    Chronic conditions affecting care: HTN    Labs ordered and results as above (interpreted by myself), concerning for nonspecific leukocytosis, slightly decreased hemoglobin but not concerning for severe anemia requiring any transfusion. Reassuring troponin. Glucose slightly elevated but the patient does not appear to be in DKA. Imaging interpreted and reviewed by myself: CT Head showed no evidence of intracranial mass, SAH or other ICH. EKG interpreted by myself as above, not acutely concerning    Patient was given the following medications:  Medications   levETIRAcetam (KEPPRA) 1,500 mg in sodium chloride 0.9 % 100 mL IVPB (has no administration in time range)   clopidogrel (PLAVIX) tablet 300 mg (has no administration in time range)   aspirin chewable tablet 81 mg (has no administration in time range)   iopamidol (ISOVUE-370) 76 % injection 75 mL (75 mLs IntraVENous Given 3/3/23 5539)       Disposition Discussion:  After discussion with Dr. Nirali Delatorre, the plan would be to hospitalize for neurology evaluation. Concern at this point would be for possible TIA versus seizure. She recommended loading with Keppra which was ordered. Also recommended antiplatelet therapy which was also ordered. Vitals have remained stable here. We will hospitalize the patient for further care.   Patient updated on plan of care and was agreeable. Disposition: Hospitalized     I am the primary physician of record    Clinical Impression:  1. Seizure-like activity ([address]    2. Ataxia of left upper extremity      Disposition referral (if applicable):  No follow-up provider specified. Disposition medications (if applicable):  New Prescriptions    No medications on file       Comment: Please note this report has been produced using speech recognition software and may contain errors related to that system including errors in grammar, punctuation, and spelling, as well as words and phrases that may be inappropriate. If there are any questions or concerns please feel free to contact the dictating provider for clarification.        Agustin Valdes MD  03/03/23 8081

## 2023-03-04 LAB
ANION GAP SERPL CALCULATED.3IONS-SCNC: 8 MMOL/L (ref 4–16)
BUN SERPL-MCNC: 17 MG/DL (ref 6–23)
CALCIUM SERPL-MCNC: 9.2 MG/DL (ref 8.3–10.6)
CHLORIDE BLD-SCNC: 100 MMOL/L (ref 99–110)
CO2: 27 MMOL/L (ref 21–32)
CREAT SERPL-MCNC: 1.2 MG/DL (ref 0.9–1.3)
GFR SERPL CREATININE-BSD FRML MDRD: >60 ML/MIN/1.73M2
GLUCOSE SERPL-MCNC: 118 MG/DL (ref 70–99)
POTASSIUM SERPL-SCNC: 4.2 MMOL/L (ref 3.5–5.1)
SODIUM BLD-SCNC: 135 MMOL/L (ref 135–145)

## 2023-03-04 PROCEDURE — 6370000000 HC RX 637 (ALT 250 FOR IP): Performed by: NURSE PRACTITIONER

## 2023-03-04 PROCEDURE — 1200000000 HC SEMI PRIVATE

## 2023-03-04 PROCEDURE — 6360000002 HC RX W HCPCS: Performed by: NURSE PRACTITIONER

## 2023-03-04 PROCEDURE — 6370000000 HC RX 637 (ALT 250 FOR IP): Performed by: STUDENT IN AN ORGANIZED HEALTH CARE EDUCATION/TRAINING PROGRAM

## 2023-03-04 PROCEDURE — 36415 COLL VENOUS BLD VENIPUNCTURE: CPT

## 2023-03-04 PROCEDURE — 97535 SELF CARE MNGMENT TRAINING: CPT

## 2023-03-04 PROCEDURE — 2580000003 HC RX 258: Performed by: NURSE PRACTITIONER

## 2023-03-04 PROCEDURE — 97166 OT EVAL MOD COMPLEX 45 MIN: CPT

## 2023-03-04 PROCEDURE — 99233 SBSQ HOSP IP/OBS HIGH 50: CPT | Performed by: NURSE PRACTITIONER

## 2023-03-04 PROCEDURE — 97162 PT EVAL MOD COMPLEX 30 MIN: CPT

## 2023-03-04 PROCEDURE — 6360000002 HC RX W HCPCS: Performed by: STUDENT IN AN ORGANIZED HEALTH CARE EDUCATION/TRAINING PROGRAM

## 2023-03-04 PROCEDURE — 2580000003 HC RX 258: Performed by: STUDENT IN AN ORGANIZED HEALTH CARE EDUCATION/TRAINING PROGRAM

## 2023-03-04 PROCEDURE — 97116 GAIT TRAINING THERAPY: CPT

## 2023-03-04 PROCEDURE — 80048 BASIC METABOLIC PNL TOTAL CA: CPT

## 2023-03-04 PROCEDURE — 94761 N-INVAS EAR/PLS OXIMETRY MLT: CPT

## 2023-03-04 RX ORDER — DONEPEZIL HYDROCHLORIDE 5 MG/1
5 TABLET, FILM COATED ORAL DAILY
Status: DISCONTINUED | OUTPATIENT
Start: 2023-03-04 | End: 2023-03-05 | Stop reason: HOSPADM

## 2023-03-04 RX ADMIN — LEVOTHYROXINE SODIUM 50 MCG: 50 TABLET ORAL at 09:27

## 2023-03-04 RX ADMIN — DONEPEZIL HYDROCHLORIDE 5 MG: 5 TABLET, FILM COATED ORAL at 21:46

## 2023-03-04 RX ADMIN — SODIUM CHLORIDE 500 MG: 9 INJECTION, SOLUTION INTRAVENOUS at 09:32

## 2023-03-04 RX ADMIN — ENOXAPARIN SODIUM 40 MG: 100 INJECTION SUBCUTANEOUS at 17:46

## 2023-03-04 RX ADMIN — SODIUM CHLORIDE, PRESERVATIVE FREE 10 ML: 5 INJECTION INTRAVENOUS at 21:46

## 2023-03-04 RX ADMIN — SODIUM CHLORIDE 500 MG: 9 INJECTION, SOLUTION INTRAVENOUS at 21:49

## 2023-03-04 RX ADMIN — CLOPIDOGREL BISULFATE 75 MG: 75 TABLET ORAL at 09:27

## 2023-03-04 RX ADMIN — FENOFIBRATE 160 MG: 160 TABLET ORAL at 09:27

## 2023-03-04 RX ADMIN — ASPIRIN 81 MG 81 MG: 81 TABLET ORAL at 09:27

## 2023-03-04 RX ADMIN — ATORVASTATIN CALCIUM 40 MG: 40 TABLET, FILM COATED ORAL at 21:46

## 2023-03-04 RX ADMIN — TAMSULOSIN HYDROCHLORIDE 0.4 MG: 0.4 CAPSULE ORAL at 09:27

## 2023-03-04 RX ADMIN — SODIUM CHLORIDE, PRESERVATIVE FREE 10 ML: 5 INJECTION INTRAVENOUS at 09:29

## 2023-03-04 NOTE — PROGRESS NOTES
Physical Therapy  Former[facility] ACUTE CARE PHYSICAL THERAPY EVALUATION  Hugo Espinosa, 1945, 3025/3025-A, 3/4/2023    History  [place]:  The primary encounter diagnosis was Seizure-like activity ([address] A diagnosis of Ataxia of left upper extremity was also pertinent to this visit. Patient  has a past medical history of Hyperlipidemia and Hypertension. Patient  has no past surgical history on file. Subjective:  Patient states:  \"I feel much better\"   Pain:  denies   Communication with other providers:  RN, co-eval with Tamanna BUNN Six   Restrictions: general precautions, falls     Home Setup/Prior level of function  Social/Functional History  Lives With: Daughter (works during the day but her brother can stop over if neded)  Type of Home: House  Home Layout: One level  Home Access: Level entry  Bathroom Shower/Tub: Tub/Shower unit, Walk-in shower (pt typically uses the tub/shower)  Bathroom Toilet: Standard  ADL Assistance: Independent  Homemaking Assistance: Independent  Ambulation Assistance: Independent  Transfer Assistance: Independent  Active : Yes    Examination of body systems (includes body structures/functions, activity/participation limitations):  Observation:  Supine in bed upon arrival. Cooperative with therapy. Daughter visiting and supportive. Vision:  [facility] [facility]  Hearing:  WFL  Cardiopulmonary:  stable vitals on room air     Musculoskeletal  ROM R/L:  WFL BLEs    Strength R/L:  BLEs grossly WFL in function and endurance     Mobility/treatment:   Rolling L/R:  NT   Supine to sit:  [place] with HOB slightly elevated  Transfers:   Sit to stand: SBA for safety from EOB and standard toilet  Stand to sit: SBA for safety to toilet and recliner   Step pivot: SBA for safety without AD (2x)  Sitting balance:  [place] at EOB and toilet ,static and light dynamic while managing dressing and toileting tasks.    Standing balance:  SBA for safety, static and light dynamic while managing grooming activity at the sink without UE support   Gait: ~250ft without AD SBA for safety. Fair and consistent pace with reciprocal gait pattern. No LOB noted with good activity tolerance. Educated pt on POC, role of PT, discharge. [facility] 6 Clicks Inpatient Mobility:  AM-PAC Inpatient Mobility Raw Score : 20    Safety: patient left in chair, call light within reach, gait belt used. Assessment:  Pt is a 78year old male admitted with seizure like activity. Recommend home with family support PRN for higher level IADLS/ADLs for safety and  PT once medically stable. At baseline he is indep with gross mobility and ADLs. He performed well this date, near typical baseline. No further acute PT needs at this time. Recommend frequent mobility with nursing while admitted to prevent deconditioning. Complexity: Moderate  Prognosis: Good, no significant barriers to participation at this time.    Plan: Discharge  Discharge Recommendations: Home with assist PRN, Home with Home health PT  Equipment: no needs     Goals:  N/a     Recommendations for NURSING mobility: ambulate in hallway as time permits     Time:   Time in: 1023  Time out: 1041  Timed treatment minutes: 8  Total time: 18 minutes     Electronically signed by:    Kelley Schmitz [id number]  3/4/2023, 12:49 PM

## 2023-03-04 NOTE — CONSULTS
Neurology Service Progress Note   [address]   Patient Name: Korey Castro  : 1945        Subjective:   Reason for consult: Seizure  Patient seen and examined today. No repeat convulsions or suspected seizure overnight. Patient sitting up he is alert and oriented x4 following commands denies headache chest pain shortness of breath. No fever neck or back pain. We reviewed his MRI together, awaiting EEG read    Past Medical History:   Diagnosis Date    Hyperlipidemia     Hypertension     :   No past surgical history on file.   Medications:  Scheduled Meds:   fenofibrate  160 mg Oral Daily    levothyroxine  50 mcg Oral Daily    tamsulosin  0.4 mg Oral Daily    atorvastatin  40 mg Oral Nightly    sodium chloride flush  5-40 mL IntraVENous 2 times per day    enoxaparin  40 mg SubCUTAneous QPM    aspirin  81 mg Oral Daily    clopidogrel  75 mg Oral Daily    levETIRAcetam  500 mg IntraVENous Q12H     Continuous Infusions:   sodium chloride       PRN Meds:.sodium chloride flush, sodium chloride, potassium chloride, magnesium sulfate, ondansetron **OR** ondansetron, acetaminophen **OR** acetaminophen    No Known Allergies  Social History     Socioeconomic History    Marital status:      Spouse name: Not on file    Number of children: Not on file    Years of education: Not on file    Highest education level: Not on file   Occupational History    Not on file   Tobacco Use    Smoking status: Never    Smokeless tobacco: Never   Vaping Use    Vaping Use: Never used   Substance and Sexual Activity    Alcohol use: Never    Drug use: Never    Sexual activity: Not on file   Other Topics Concern    Not on file   Social History Narrative    Not on file     Social Determinants of Health     Financial Resource Strain: Not on file   Food Insecurity: Not on file   Transportation Needs: Not on file   Physical Activity: Not on file   Stress: Not on file   Social Connections: Not on file   Intimate Partner Violence: Not on file   Housing Stability: Not on file      Family History   Problem Relation Age of Onset    Tuberculosis Mother     Coronary Art Dis Father     Hypertension Father          ROS (10 systems)  In addition to that documented in the HPI above, the additional ROS was obtained:  Constitutional: Denies fevers or chills  Eyes: Denies vision changes  ENMT: Denies sore throat  CV: Denies chest pain  Resp: Denies SOB  GI: Denies vomiting or diarrhea  : Denies painful urination  MSK: Denies recent trauma  Skin: Denies new rashes  Neuro: Denies new numbness or tingling or weakness  Endocrine: Denies unexpected weight loss  Heme: Denies bleeding disorders    Physical Exam:      Wt Readings from Last 3 Encounters:   03/04/23 165 lb 9.1 oz (75.1 kg)   03/03/23 160 lb 15 oz (73 kg)   04/15/22 195 lb (88.5 kg)     Temp Readings from Last 3 Encounters:   03/04/23 97.9 °F (36.6 °C) (Oral)   04/16/22 98.4 °F (36.9 °C) (Oral)     BP Readings from Last 3 Encounters:   03/04/23 102/66   04/16/22 (!) 110/57     Pulse Readings from Last 3 Encounters:   03/04/23 51   04/16/22 67        Gen: A&O x 4, NAD, cooperative  HEENT: NC/AT, EOMI, PERRL, mmm,  neck supple, no meningeal signs  Heart: NSR  Lungs: Respirations even and unlabored  Ext: no edema, no calf tenderness b/l  Psych: normal mood and affect, confusion  Skin: no rashes or lesions    NEUROLOGIC EXAM:    Mental Status: A&O to self, location, month and year, NAD, speech clear, language fluent, repetition and naming intact, follows commands appropriately, unable to spell world backwards, unable to do simple math problems    Cranial Nerve Exam:   CN II-XII: PERRL, VFF, no nystagmus, no gaze paresis, sensation V1-V3 intact b/l, muscles of facial expression symmetric; hearing intact to conversational tone, palate elevates symmetrically, shoulder elevation symmetric and tongue protrudes midline with movement side to side.     Motor Exam:       Strength 5/5 UE's/LE's b/l  Tone and bulk normal   No pronator drift    Deep Tendon Reflexes: 2/4 biceps, triceps, brachioradialis, patellar, and achilles b/l; flexor plantar responses b/l    Sensation: Intact light touch/pinprick/vibration UE's/LE's b/l    Coordination/Cerebellum:       Tremors--none      Rapidly alternating movements: no dysdiadochokinesia b/l                Heel-to-Shin: no dysmetria b/l      Finger-to-Nose: no dysmetria b/l    Gait and stance:      Gait: deferred      LABS:     Recent Labs     03/03/23  0354 03/03/23  0359 03/03/23  0633 03/04/23  0402   WBC 12.9*  --   --   --    *  --   --  135   K 4.0  --   --  4.2   CL 97*  --   --  100   CO2 22  --   --  27   BUN 18  --   --  17   CREATININE 1.2  --   --  1.2   GLUCOSE 135* 130  --  118*   INR  --   --  1.14  --               IMAGING:    CT head w/o contrast:  Impression   1. No acute intracranial ischemia or hemorrhage is identified. 2.  Generalized age-related cortical atrophy, with intracranial   atherosclerosis and CT findings suggestive of chronic microvascular ischemic   change. CTA head and neck:     Impression   Evidence of 50% stenosis due to calcified plaques at the origin of the right   internal carotid artery. Evidence of severe stenosis, which is about 80% stenosis due to prominent   calcified plaques at the origin of the left internal carotid artery. In the rest of bilateral carotid arterial systems and vertebral arteries in   neck there is no additional significant stenosis or dissection. Bilateral   vertebral arteries are of small size. No evidence of compromise in the and intracranial anterior circulation of   bilateral internal carotid arteries. No evidence of compromise in the vertebrobasilar arterial circulation. No evidence of dural sinus thrombosis. No definable focal abnormality or acute process in the brain.      MRI brain w/wo contrast:  Impression:     No acute intracranial abnormality visualized. Routine EEG:  Completed-pending final read    All imaging was personally reviewed   ASSESSMENT/PLAN:   82-year-old male presenting with seizure like episode followed by facial weakness concerning for stroke. Episode of generalized shaking consistent with seizure. This was followed by facial weakness and confusion likely secondary to postictal state and Yan's paralysis. Neuroimaging as above  CTA with 80% stenosis in the left ICA, 50% right ICA  MRI brain w/wo contrast unremarkable   Routine EEG-has been completed, pending final read  Continue Keppra 500 mg twice daily pending EEG results  Patient received Keppra load in the ED  Maintain seizure precautions  Continue DAPT with aspirin and Plavix due to bilateral ICA stenosis  Continue atorvastatin for secondary stroke prevention    TSH WNL, A1c shows pre-diabetes   PT/OT as recommended  Do not drive until cleared by your neurologist. No tub baths or swimming. No operating heavy or dangerous equipment. Do not use ladders. Further recommendations pending EEG results  CT surgery for carotid stenosis             Thank you for allowing us to participate in the care of your patient. If there are any questions regarding evaluation please feel free to contact us.      JUNO Kenyon - KEHINDE, 3/4/2023

## 2023-03-04 NOTE — PROGRESS NOTES
Occupational [facility] ACUTE CARE OCCUPATIONAL THERAPY EVALUATION    Jill Gauthier, 1945, 3025/3025-A, 3/4/2023    Discharge Recommendation: Home w/ assist prn      History:  [place]:  The primary encounter diagnosis was Seizure-like activity ([address] A diagnosis of Ataxia of left upper extremity was also pertinent to this visit.   Past Medical History:   Diagnosis Date    Hyperlipidemia     Hypertension        Subjective:  Patient states: \"I've been in bed for awhile\"  Pain:  denies  Communication with other providers: co-eval w/ PT, handoff to RN  Restrictions: General Precautions, Fall Risk    Home Setup/Prior level of function:  Social/Functional History  Lives With: Daughter (works during the day)  Type of Home: [address] [place] [address],[address]: One level  Home Access: Level entry  Bathroom Shower/Tub: Tub/Shower unit, Walk-in shower (pt typically uses the tub/shower)  Bathroom Toilet: Standard  ADL Assistance: Independent  Homemaking Assistance: Independent  Ambulation Assistance: Independent  Transfer Assistance: Independent  Active : Yes     Examination:  Observation: Supine in bed upon arrival, agreeable to therapy eval.  Vision: WFL  Hearing: WFL  Vitals: Stable vitals throughout session on room air      [address] and functions:  ROM: WFL   Strength: B UE grossly 4+/5 across all major joints   Sensation: WFL  Tone: Normal  Coordination: WFL  Perception: WNL      Cognitive and Psychosocial Functioning:  Overall cognitive status: alert and oriented, WFL  Affect: Normal       Functional Mobility:  Bed mobility:  supine to sitting EOB w/ supervision  Sitting balance:  supervision    Transfers: STS from EOB w/ supervision, stand to sit to recliner w/ supervision  Standing balance:  Supervision  Functional Mobility: ambulated household distance w/o AD w/ supervision  Toilet/Shower Transfers: supervision STS to/from standard toilet        Activities of Daily Living (ADLs):  Feeding: set up A  Grooming: supervision  UB bathing: supervision  LB bathing: supervision  UB dressing: supervision to don gown  LB dressing: supervision to doff/don depends  Toileting: supervision hygiene and clothing mgmt     *ADL determined per observation of functional mobility, balance, activity tolerance, cognition, or actual ADL performance. AM-PAC 6 click short form for inpatient daily activity:   How much help from another person does the patient currently need. .. Unable  Dep A Lot  Max A A Lot   Mod A A Little  Min A A Little   CGA  SBA None   Mod I  Indep  Sup   1. Putting on and taking off regular lower body clothing? [] 1    [] 2   [] 2   [] 3   [] 3   [x] 4      2. Bathing (including washing, rinsing, drying)? [] 1   [] 2   [] 2 [] 3 [] 3 [x] 4   3. Toileting, which includes using toilet, bedpan, or urinal? [] 1    [] 2   [] 2   [] 3   [] 3   [x] 4     4. Putting on and taking off regular upper body clothing? [] 1   [] 2   [] 2   [] 3   [] 3    [x] 4      5. Taking care of personal grooming such as brushing teeth? [] 1   [] 2    [] 2 [] 3    [] 3   [x] 4      6. Eating meals? [] 1   [] 2   [] 2   [] 3   [] 3   [x] 4        Raw Score:  24     [24=0% impaired(CH), 23=1-19%(CI), 20-22=20-39%(CJ), 15-19=40-59%(CK), 10-14=60-79%(CL), 7-9=80-99%(CM), 6=100%(CN)]     Treatment:    Self Care Training:   Cues were given for safety, sequence, UE/LE placement, visual cues, and balance. Activities performed today included UB/LB dressing tasks, toileting, hand hygiene at sink    Educated pt on role of OT, therapy POC and functional goals, progression w/ ADLs and transfers, importance of movement and OOB activity, d/c recommendations     Safety Measures: Gait belt used, Left in recliner, Alarm in place  Recommendations for NURSING activity:  Up to chair for all 3 meals and up to bathroom for all toileting needs     Assessment:  Pt is a 78 y o M admitted d/t seizures.  Pt at baseline is IND for ADLs, IND for high level IADLs, and IND for functional transfers/mobility w/o AD. Pt currently presents at/near baseline w/ ADLs and transfers/mobility. No further acute OT needs. Complexity: Moderate  Prognosis: Good, no significant barriers to participation at this time.    Occupational Therapy Plan  Times Per Week: d/c       Time:   Time in: 2972  Time out: 1041  Total time: 18  Timed treatment minutes: 10        Electronically signed by:      Bartolo Montoya OTR/L  [id number]

## 2023-03-04 NOTE — PLAN OF CARE
Problem: Discharge Planning  Goal: Discharge to home or other facility with appropriate resources  3/4/2023 1624 by Eliu Salmeron RN  Outcome: Progressing  Flowsheets (Taken 3/4/2023 1624)  Discharge to home or other facility with appropriate resources:   Identify barriers to discharge with patient and caregiver   Arrange for needed discharge resources and transportation as appropriate  Note: Pt will discharge to home with daughter  3/4/2023 0521 by Staci Zambrano RN  Outcome: Progressing

## 2023-03-04 NOTE — PROGRESS NOTES
V2.0  [facility] [place]      Name:  Patrick Kirkland /Age/Sex: 1945  (78 y.o. male)   MRN & CSN:  [id number] & [id number] Encounter Date/Time: 3/4/2023 3:11 PM EST    Location:  [address] [place] [address] PCP: Ella Perez MD       Hospital Day: 2    Assessment and Plan:   Patrick Kirkland is a 78 y.o. male  who presents with Seizures ([facility] [address]      1. Seizure episode  -Patient reportedly had a seizure at home with urinary incontinence. Denies any tongue biting  -CT head no acute intracranial abnormality  -CTA head and neck showed 50% right ICA and 80% left ICA  stenosis  -MRI showed no intracranial abnormality  -Neurology was consulted  -Started on Keppra  -EEG pending  -On DAPT  -PT OT/SLP eval  -2D echo/hemoglobin A1c/lipid profile    2. History of dementia  -On Aricept. Currently holding due to borderline bradycardia    3. Essential hypertension  -Hold antihypertensives at this time    4. Hypothyroidism  -Continue Synthroid        Diet ADULT DIET; Regular   DVT Prophylaxis [] Lovenox, []  Heparin, [] SCDs, [] Ambulation,  [] Eliquis, [] Xarelto  [] Coumadin   Code Status Full Code   Disposition From: Home  Expected Disposition: TBD  Estimated Date of Discharge: TBD  Patient requires continued admission due to seizure episode   Surrogate Decision Maker/ POA      Subjective:     Chief Complaint: Seizures (New onset, full body seizure)     Patient seen and examined at bedside today. Denies any seizure episodes since presentation but states that he reportedly had a generalized seizure at home with loss of urinary control. Denies chest pain, shortness of breath, nausea vomiting, fever or chills. Daughter at bedside updated on plan of care and questions answered  Objective:      Intake/Output Summary (Last 24 hours) at 3/4/2023 1511  Last data filed at 3/4/2023 1217  Gross per 24 hour   Intake 720 ml   Output 675 ml   Net 45 ml        Vitals:   Vitals:    23 0800   BP: 102/66   Pulse:    Resp:    Temp: 97.9 °F (36.6 °C)   SpO2: 97%       Physical Exam:     General: NAD  Eyes: EOMI  ENT: neck supple  Cardiovascular: Regular rate. Respiratory: Clear to auscultation  Gastrointestinal: Soft, non tender  Genitourinary: no suprapubic tenderness  Musculoskeletal: No edema  Skin: warm, dry  Neuro: Alert. Psych: Mood appropriate. Medications:   Medications:    fenofibrate  160 mg Oral Daily    levothyroxine  50 mcg Oral Daily    tamsulosin  0.4 mg Oral Daily    atorvastatin  40 mg Oral Nightly    sodium chloride flush  5-40 mL IntraVENous 2 times per day    enoxaparin  40 mg SubCUTAneous QPM    aspirin  81 mg Oral Daily    clopidogrel  75 mg Oral Daily    levETIRAcetam  500 mg IntraVENous Q12H      Infusions:    sodium chloride       PRN Meds: sodium chloride flush, 5-40 mL, PRN  sodium chloride, , PRN  potassium chloride, 10 mEq, PRN  magnesium sulfate, 2,000 mg, PRN  ondansetron, 4 mg, Q8H PRN   Or  ondansetron, 4 mg, Q6H PRN  acetaminophen, 650 mg, Q6H PRN   Or  acetaminophen, 650 mg, Q6H PRN        Labs      Recent Results (from the past 24 hour(s))   Basic Metabolic Panel w/ Reflex to MG    Collection Time: 03/04/23  4:02 AM   Result Value Ref Range    Sodium 135 135 - 145 MMOL/L    Potassium 4.2 3.5 - 5.1 MMOL/L    Chloride 100 99 - 110 mMol/L    CO2 27 21 - 32 MMOL/L    Anion Gap 8 4 - 16    BUN 17 6 - 23 MG/DL    Creatinine 1.2 0.9 - 1.3 MG/DL    Est, Glom Filt Rate >60 >60 mL/min/1.73m2    Glucose 118 (H) 70 - 99 MG/DL    Calcium 9.2 8.3 - 10.6 MG/DL        Imaging/Diagnostics Last 24 Hours   CT HEAD WO CONTRAST    Addendum Date: 3/3/2023    ADDENDUM: Findings were discussed with Dr. Jarrod Pendleton at 4:42 a.m. on 03/03/2023 by our CORE teammate Alexandria Palafox.      Result Date: 3/3/2023  EXAMINATION: CT OF THE HEAD WITHOUT CONTRAST  3/3/2023 4:08 am TECHNIQUE: CT of the head was performed without the administration of intravenous contrast. Automated exposure control, iterative reconstruction, and/or weight based adjustment of the mA/kV was utilized to reduce the radiation dose to as low as reasonably achievable. COMPARISON: None. HISTORY: ORDERING SYSTEM PROVIDED HISTORY: Stroke Symptoms TECHNOLOGIST PROVIDED HISTORY: Has a \"code stroke\" or \"stroke alert\" been called? ->Yes Reason for exam:->Stroke Symptoms Decision Support Exception - unselect if not a suspected or confirmed emergency medical condition->Emergency Medical Condition (MA) Reason for Exam: Stroke Symptoms FINDINGS: BRAIN/VENTRICLES: There is no acute intracranial hemorrhage, mass effect or midline shift. No abnormal extra-axial fluid collection. The gray-white differentiation is maintained without evidence of an acute infarct. There is no evidence of hydrocephalus. No basilar cistern or sulcal effacement. Patchy white matter low attenuation is identified within the periventricular and subcortical white matter. Age related mild generalized cortical atrophy. Intracranial atherosclerosis. ORBITS: The visualized portion of the orbits demonstrate no acute abnormality. SINUSES: The visualized paranasal sinuses and mastoid air cells demonstrate no acute abnormality. SOFT TISSUES/SKULL:  No acute abnormality of the visualized skull or soft tissues. 1. No acute intracranial ischemia or hemorrhage is identified. 2.  Generalized age-related cortical atrophy, with intracranial atherosclerosis and CT findings suggestive of chronic microvascular ischemic change. CTA HEAD NECK W CONTRAST    Result Date: 3/3/2023  EXAMINATION: CTA OF THE HEAD AND NECK WITH CONTRAST 3/3/2023 3:08 am: TECHNIQUE: CTA of the head and neck was performed with the administration of intravenous contrast. Multiplanar reformatted images are provided for review. MIP images are provided for review. Stenosis of the internal carotid arteries measured using NASCET criteria.  Automated exposure control, iterative reconstruction, and/or weight based adjustment of the mA/kV was utilized to reduce the radiation dose to as low as reasonably achievable. COMPARISON: None. HISTORY: ORDERING SYSTEM PROVIDED HISTORY: Stroke Symptoms TECHNOLOGIST PROVIDED HISTORY: Has a \"code stroke\" or \"stroke alert\" been called? ->Yes Reason for exam:->Stroke Symptoms Decision Support Exception - unselect if not a suspected or confirmed emergency medical condition->Emergency Medical Condition (MA) Reason for Exam: Stroke Symptoms FINDINGS: CTA NECK: AORTIC ARCH/ARCH VESSELS: No dissection or arterial injury. No significant stenosis of the brachiocephalic or subclavian arteries. CAROTID ARTERIES: No evidence of dissection or arterial injury in the carotid arteries. Evidence of calcified plaques with 50% stenosis at the origin of the right internal carotid artery. Evidence of calcified plaques with severe stenosis, which is about 80% stenosis at the origin of the left internal carotid artery. In the rest of common carotid and internal carotid arteries of both sides in neck there is no additional significant stenosis or dissection. VERTEBRAL ARTERIES: No dissection, arterial injury, or significant stenosis. Bilateral vertebral arteries are of small caliber. SOFT TISSUES: The lung apices are clear. No cervical or superior mediastinal lymphadenopathy. The larynx and pharynx are unremarkable. No acute abnormality of the salivary and thyroid glands. BONES: No acute osseous abnormality. Evidence of moderate multilevel degenerative disc disease in the midportion and lower portion of cervical spine. CTA HEAD: ANTERIOR CIRCULATION: No significant stenosis of the intracranial internal carotid, anterior cerebral, or middle cerebral arteries. No aneurysm. POSTERIOR CIRCULATION: No significant stenosis of the vertebral, basilar, or posterior cerebral arteries. No aneurysm. OTHER: No dural venous sinus thrombosis on this non-dedicated study. BRAIN: No mass effect or midline shift. No extra-axial fluid collection.  The gray-white differentiation is maintained. Evidence of 50% stenosis due to calcified plaques at the origin of the right internal carotid artery. Evidence of severe stenosis, which is about 80% stenosis due to prominent calcified plaques at the origin of the left internal carotid artery. In the rest of bilateral carotid arterial systems and vertebral arteries in neck there is no additional significant stenosis or dissection. Bilateral vertebral arteries are of small size. No evidence of compromise in the and intracranial anterior circulation of bilateral internal carotid arteries. No evidence of compromise in the vertebrobasilar arterial circulation. No evidence of dural sinus thrombosis. No definable focal abnormality or acute process in the brain. MRI BRAIN W WO CONTRAST    Result Date: 3/3/2023  EXAMINATION: MRI OF THE BRAIN WITHOUT AND WITH CONTRAST  3/3/2023 4:16 pm TECHNIQUE: Multiplanar multisequence MRI of the head/brain was performed without and with the administration of intravenous contrast. COMPARISON: None. HISTORY: ORDERING SYSTEM PROVIDED HISTORY: new onset seizure w left sided weakness r/o mass or stroke TECHNOLOGIST PROVIDED HISTORY: Reason for exam:->new onset seizure w left sided weakness r/o mass or stroke What is the sedation requirement?->None Reason for Exam: new onset seizure w left sided weakness r/o mass or stroke Relevant Medical/Surgical History: 15mL prohance; GFR >60 FINDINGS: INTRACRANIAL STRUCTURES/VENTRICLES:  There is no acute infarct. No mass effect or midline shift. No evidence of an acute intracranial hemorrhage. There is volume loss with mild chronic white matter microvascular ischemic change. The sellar/suprasellar regions appear unremarkable. The normal signal voids within the major intracranial vessels appear maintained. No abnormal focus of enhancement is seen within the brain. ORBITS: The visualized portion of the orbits demonstrate no acute abnormality.  SINUSES: The visualized paranasal sinuses and mastoid air cells demonstrate no acute abnormality. BONES/SOFT TISSUES: The bone marrow signal intensity appears normal. The soft tissues demonstrate no acute abnormality.     No acute intracranial abnormality visualized.       Electronically signed by Juanjo Toussaint MD on 3/4/2023 at 3:11 PM

## 2023-03-05 VITALS
DIASTOLIC BLOOD PRESSURE: 66 MMHG | HEART RATE: 59 BPM | HEIGHT: 71 IN | SYSTOLIC BLOOD PRESSURE: 113 MMHG | OXYGEN SATURATION: 100 % | WEIGHT: 164.56 LBS | RESPIRATION RATE: 17 BRPM | TEMPERATURE: 98.1 F | BODY MASS INDEX: 23.04 KG/M2

## 2023-03-05 PROBLEM — R56.9 SEIZURE-LIKE ACTIVITY (HCC): Status: ACTIVE | Noted: 2023-03-05

## 2023-03-05 PROCEDURE — 2580000003 HC RX 258: Performed by: STUDENT IN AN ORGANIZED HEALTH CARE EDUCATION/TRAINING PROGRAM

## 2023-03-05 PROCEDURE — 6370000000 HC RX 637 (ALT 250 FOR IP): Performed by: STUDENT IN AN ORGANIZED HEALTH CARE EDUCATION/TRAINING PROGRAM

## 2023-03-05 PROCEDURE — 95819 EEG AWAKE AND ASLEEP: CPT | Performed by: STUDENT IN AN ORGANIZED HEALTH CARE EDUCATION/TRAINING PROGRAM

## 2023-03-05 PROCEDURE — 93005 ELECTROCARDIOGRAM TRACING: CPT | Performed by: STUDENT IN AN ORGANIZED HEALTH CARE EDUCATION/TRAINING PROGRAM

## 2023-03-05 PROCEDURE — 94761 N-INVAS EAR/PLS OXIMETRY MLT: CPT

## 2023-03-05 PROCEDURE — 6360000002 HC RX W HCPCS: Performed by: NURSE PRACTITIONER

## 2023-03-05 PROCEDURE — 2580000003 HC RX 258: Performed by: NURSE PRACTITIONER

## 2023-03-05 RX ORDER — ASPIRIN 81 MG/1
81 TABLET, CHEWABLE ORAL DAILY
Qty: 30 TABLET | Refills: 3 | Status: SHIPPED | OUTPATIENT
Start: 2023-03-06 | End: 2023-03-05 | Stop reason: SDUPTHER

## 2023-03-05 RX ORDER — ATORVASTATIN CALCIUM 40 MG/1
40 TABLET, FILM COATED ORAL NIGHTLY
Qty: 30 TABLET | Refills: 3 | Status: SHIPPED | OUTPATIENT
Start: 2023-03-05

## 2023-03-05 RX ORDER — ATORVASTATIN CALCIUM 40 MG/1
40 TABLET, FILM COATED ORAL DAILY
Status: COMPLETED | OUTPATIENT
Start: 2023-03-05 | End: 2023-03-05

## 2023-03-05 RX ORDER — ATORVASTATIN CALCIUM 40 MG/1
40 TABLET, FILM COATED ORAL NIGHTLY
Qty: 30 TABLET | Refills: 3 | Status: SHIPPED | OUTPATIENT
Start: 2023-03-05 | End: 2023-03-05 | Stop reason: SDUPTHER

## 2023-03-05 RX ORDER — CLOPIDOGREL BISULFATE 75 MG/1
75 TABLET ORAL DAILY
Qty: 30 TABLET | Refills: 3 | Status: SHIPPED | OUTPATIENT
Start: 2023-03-06 | End: 2023-03-24

## 2023-03-05 RX ORDER — ASPIRIN 81 MG/1
81 TABLET, CHEWABLE ORAL DAILY
Qty: 30 TABLET | Refills: 3 | Status: SHIPPED | OUTPATIENT
Start: 2023-03-06

## 2023-03-05 RX ORDER — CLOPIDOGREL BISULFATE 75 MG/1
75 TABLET ORAL DAILY
Qty: 30 TABLET | Refills: 3 | Status: SHIPPED | OUTPATIENT
Start: 2023-03-06 | End: 2023-03-05 | Stop reason: SDUPTHER

## 2023-03-05 RX ADMIN — SODIUM CHLORIDE 500 MG: 9 INJECTION, SOLUTION INTRAVENOUS at 08:32

## 2023-03-05 RX ADMIN — LEVOTHYROXINE SODIUM 50 MCG: 50 TABLET ORAL at 08:27

## 2023-03-05 RX ADMIN — SODIUM CHLORIDE, PRESERVATIVE FREE 10 ML: 5 INJECTION INTRAVENOUS at 08:28

## 2023-03-05 RX ADMIN — CLOPIDOGREL BISULFATE 75 MG: 75 TABLET ORAL at 08:27

## 2023-03-05 RX ADMIN — FENOFIBRATE 160 MG: 160 TABLET ORAL at 08:28

## 2023-03-05 RX ADMIN — ATORVASTATIN CALCIUM 40 MG: 40 TABLET, FILM COATED ORAL at 14:40

## 2023-03-05 RX ADMIN — TAMSULOSIN HYDROCHLORIDE 0.4 MG: 0.4 CAPSULE ORAL at 08:28

## 2023-03-05 RX ADMIN — ASPIRIN 81 MG 81 MG: 81 TABLET ORAL at 08:28

## 2023-03-05 NOTE — PLAN OF CARE
Problem: Discharge Planning  Goal: Discharge to home or other facility with appropriate resources  3/4/2023 2133 by Larry Villarreal RN  Outcome: Progressing  3/4/2023 1624 by Jonatan Griffith RN  Outcome: Progressing  Flowsheets (Taken 3/4/2023 1624)  Discharge to home or other facility with appropriate resources:   Identify barriers to discharge with patient and caregiver   Arrange for needed discharge resources and transportation as appropriate  Note: Pt will discharge to home with daughter

## 2023-03-05 NOTE — DISCHARGE SUMMARY
V2.0  Discharge Summary    Name:  Alex DIMAS/Age/Sex: 1945 (77 y.o. male)   Admit Date: 3/3/2023  Discharge Date: 3/5/23    MRN & CSN:  [id number] & [id number] Encounter Date and Time 3/5/23 1:25 PM EST    Attending:  Juanjo Toussaint MD Discharging Provider: Juanjo Toussaint MD       Hospital Course:     Brief HPI:   Patient is a 77-year-old male with a PMHx of HTN, HLD and BPH who presented to the ED with seizures with postictal state during sleep. LKW 2300 3/2. No previous hx of seizures. No injuries or falls. EMS noticed left-sided facial droop and weakness on their evaluation which improved prior to arrival. No fevers/chills, however, recently had suspected sinusitis. No CP, palpitations, SOB or other complaints    Brief Problem Based Course:        1.  Seizure episode  -Patient reportedly had a seizure at home with urinary incontinence.  Denies any tongue biting  -CT head no acute intracranial abnormality  -CTA head and neck showed 50% right ICA and 80% left ICA  stenosis  -MRI showed no intracranial abnormality  -Neurology was consulted  -Received doses of Keppra  -EEG did not show any active seizures  -Neurology recommended continued  DAPT and follow-up outpatient with neurology team  -PT OT/SLP eval  -2D echo completed show EF of 55% with normal systolic function.  Negative bubble study     2.  History of dementia  -On Aricept.       3.  Essential hypertension  -Normotensive during this admission   -Held blood pressure medications.  Follow-up outpatient with PCP to consider resumption if blood pressure is elevated    4.  Hypothyroidism  -Continue Synthroid    The patient expressed appropriate understanding of, and agreement with the discharge recommendations, medications, and plan.     Consults this admission:  IP CONSULT TO NEUROLOGY    Discharge Diagnosis:   Seizures (HCC)        Discharge Instruction:   Follow up appointments:   Primary care physician: MEI RODRIGUEZ MD within 2 weeks  Diet:  regular diet   Activity: activity as tolerated  Disposition: Discharged to:   [x]Home, [][facility], []SNF, []Acute Rehab, []Hospice   Condition on discharge: Stable  Labs and Tests to be Followed up as an outpatient by PCP or Specialist:     Discharge Medications:        Medication List        START taking these medications      aspirin 81 MG chewable tablet  Take 1 tablet by mouth daily  Start taking on: March 6, 2023     atorvastatin 40 MG tablet  Commonly known as: LIPITOR  Take 1 tablet by mouth nightly     clopidogrel 75 MG tablet  Commonly known as: PLAVIX  Take 1 tablet by mouth daily for 18 doses  Start taking on: March 6, 2023            Mikayla Gomes taking these medications      bisoprolol-hydroCHLOROthiazide 10-6.25 MG per tablet  Commonly known as: ZIAC     donepezil 5 MG tablet  Commonly known as: ARICEPT     fenofibrate 160 MG tablet  Commonly known as: TRIGLIDE     levothyroxine 50 MCG tablet  Commonly known as: SYNTHROID     tamsulosin 0.4 MG capsule  Commonly known as: FLOMAX            STOP taking these medications      amLODIPine 10 MG tablet  Commonly known as: NORVASC     lovastatin 40 MG tablet  Commonly known as: MEVACOR               Where to Get Your Medications        These medications were sent to [address] [place] [address]. - P [phone] - F [phone]  [facility] [address] [facility] [address]      Phone: [phone]   aspirin 81 MG chewable tablet  atorvastatin 40 MG tablet  clopidogrel 75 MG tablet        Objective Findings at Discharge:   /66   Pulse 59   Temp 98.1 °F (36.7 °C)   Resp 17   Ht 5' 11\" (1.803 m)   Wt 164 lb 9 oz (74.6 kg)   SpO2 100%   BMI 22.95 kg/m²       Physical Exam:   General: NAD  Eyes: EOMI  ENT: neck supple  Cardiovascular: Regular rate. Respiratory: Clear to auscultation  Gastrointestinal: Soft, non tender  Genitourinary: no suprapubic tenderness  Musculoskeletal: No edema  Skin: warm, dry  Neuro: Alert. Psych: Mood appropriate. Labs and Imaging   CT HEAD WO CONTRAST    Addendum Date: 3/3/2023    ADDENDUM: Findings were discussed with Dr. Angel Benjamin at 4:42 a.m. on 03/03/2023 by our [address] [place] [address]. Result Date: 3/3/2023  EXAMINATION: CT OF THE HEAD WITHOUT CONTRAST  3/3/2023 4:08 am TECHNIQUE: CT of the head was performed without the administration of intravenous contrast. Automated exposure control, iterative reconstruction, and/or weight based adjustment of the mA/kV was utilized to reduce the radiation dose to as low as reasonably achievable. COMPARISON: None. HISTORY: ORDERING SYSTEM PROVIDED HISTORY: Stroke Symptoms TECHNOLOGIST PROVIDED HISTORY: Has a \"code stroke\" or \"stroke alert\" been called? ->Yes Reason for exam:->Stroke Symptoms Decision Support Exception - unselect if not a suspected or confirmed emergency medical condition->Emergency Medical Condition (MA) Reason for Exam: Stroke Symptoms FINDINGS: BRAIN/VENTRICLES: There is no acute intracranial hemorrhage, mass effect or midline shift. No abnormal extra-axial fluid collection. The gray-white differentiation is maintained without evidence of an acute infarct. There is no evidence of hydrocephalus. No basilar cistern or sulcal effacement. Patchy white matter low attenuation is identified within the periventricular and subcortical white matter. Age related mild generalized cortical atrophy. Intracranial atherosclerosis. ORBITS: The visualized portion of the orbits demonstrate no acute abnormality. SINUSES: The visualized paranasal sinuses and mastoid air cells demonstrate no acute abnormality. SOFT TISSUES/SKULL:  No acute abnormality of the visualized skull or soft tissues. 1. No acute intracranial ischemia or hemorrhage is identified. 2.  Generalized age-related cortical atrophy, with intracranial atherosclerosis and CT findings suggestive of chronic microvascular ischemic change.      CTA HEAD NECK W CONTRAST    Result Date: 3/3/2023  EXAMINATION: CTA OF THE HEAD AND NECK WITH CONTRAST 3/3/2023 3:08 am: TECHNIQUE: CTA of the head and neck was performed with the administration of intravenous contrast. Multiplanar reformatted images are provided for review. MIP images are provided for review. Stenosis of the internal carotid arteries measured using NASCET criteria. Automated exposure control, iterative reconstruction, and/or weight based adjustment of the mA/kV was utilized to reduce the radiation dose to as low as reasonably achievable. COMPARISON: None. HISTORY: ORDERING SYSTEM PROVIDED HISTORY: Stroke Symptoms TECHNOLOGIST PROVIDED HISTORY: Has a \"code stroke\" or \"stroke alert\" been called? ->Yes Reason for exam:->Stroke Symptoms Decision Support Exception - unselect if not a suspected or confirmed emergency medical condition->Emergency Medical Condition (MA) Reason for Exam: Stroke Symptoms FINDINGS: CTA NECK: AORTIC ARCH/ARCH VESSELS: No dissection or arterial injury. No significant stenosis of the brachiocephalic or subclavian arteries. CAROTID ARTERIES: No evidence of dissection or arterial injury in the carotid arteries. Evidence of calcified plaques with 50% stenosis at the origin of the right internal carotid artery. Evidence of calcified plaques with severe stenosis, which is about 80% stenosis at the origin of the left internal carotid artery. In the rest of common carotid and internal carotid arteries of both sides in neck there is no additional significant stenosis or dissection. VERTEBRAL ARTERIES: No dissection, arterial injury, or significant stenosis. Bilateral vertebral arteries are of small caliber. SOFT TISSUES: The lung apices are clear. No cervical or superior mediastinal lymphadenopathy. The larynx and pharynx are unremarkable. No acute abnormality of the salivary and thyroid glands. BONES: No acute osseous abnormality. Evidence of moderate multilevel degenerative disc disease in the midportion and lower portion of cervical spine.  CTA HEAD: ANTERIOR CIRCULATION: No significant stenosis of the intracranial internal carotid, anterior cerebral, or middle cerebral arteries. No aneurysm. POSTERIOR CIRCULATION: No significant stenosis of the vertebral, basilar, or posterior cerebral arteries. No aneurysm. OTHER: No dural venous sinus thrombosis on this non-dedicated study. BRAIN: No mass effect or midline shift. No extra-axial fluid collection. The gray-white differentiation is maintained. Evidence of 50% stenosis due to calcified plaques at the origin of the right internal carotid artery. Evidence of severe stenosis, which is about 80% stenosis due to prominent calcified plaques at the origin of the left internal carotid artery. In the rest of bilateral carotid arterial systems and vertebral arteries in neck there is no additional significant stenosis or dissection. Bilateral vertebral arteries are of small size. No evidence of compromise in the and intracranial anterior circulation of bilateral internal carotid arteries. No evidence of compromise in the vertebrobasilar arterial circulation. No evidence of dural sinus thrombosis. No definable focal abnormality or acute process in the brain. MRI BRAIN W WO CONTRAST    Result Date: 3/3/2023  EXAMINATION: MRI OF THE BRAIN WITHOUT AND WITH CONTRAST  3/3/2023 4:16 pm TECHNIQUE: Multiplanar multisequence MRI of the head/brain was performed without and with the administration of intravenous contrast. COMPARISON: None. HISTORY: ORDERING SYSTEM PROVIDED HISTORY: new onset seizure w left sided weakness r/o mass or stroke TECHNOLOGIST PROVIDED HISTORY: Reason for exam:->new onset seizure w left sided weakness r/o mass or stroke What is the sedation requirement?->None Reason for Exam: new onset seizure w left sided weakness r/o mass or stroke Relevant Medical/Surgical History: 15mL prohance; GFR >60 FINDINGS: INTRACRANIAL STRUCTURES/VENTRICLES:  There is no acute infarct. No mass effect or midline shift. No evidence of an acute intracranial hemorrhage. There is volume loss with mild chronic white matter microvascular ischemic change. The sellar/suprasellar regions appear unremarkable. The normal signal voids within the major intracranial vessels appear maintained. No abnormal focus of enhancement is seen within the brain. ORBITS: The visualized portion of the orbits demonstrate no acute abnormality. SINUSES: The visualized paranasal sinuses and mastoid air cells demonstrate no acute abnormality. BONES/SOFT TISSUES: The bone marrow signal intensity appears normal. The soft tissues demonstrate no acute abnormality. No acute intracranial abnormality visualized. CBC:   Recent Labs     03/03/23  0354   WBC 12.9*   HGB 12.2*        BMP:    Recent Labs     03/03/23  0354 03/03/23  0359 03/04/23  0402   *  --  135   K 4.0  --  4.2   CL 97*  --  100   CO2 22  --  27   BUN 18  --  17   CREATININE 1.2  --  1.2   GLUCOSE 135* 130 118*     Hepatic: No results for input(s): AST, ALT, ALB, BILITOT, ALKPHOS in the last 72 hours. Lipids:   Lab Results   Component Value Date/Time    CHOL 172 03/03/2023 06:33 AM    HDL 24 03/03/2023 06:33 AM    TRIG 130 03/03/2023 06:33 AM     Hemoglobin A1C:   Lab Results   Component Value Date/Time    LABA1C 6.4 03/03/2023 06:33 AM     TSH: No results found for: TSH  Troponin:   Lab Results   Component Value Date/Time    TROPONINT <0.010 03/03/2023 03:54 AM    TROPONINT <0.010 04/13/2022 07:10 AM    TROPONINT <0.010 04/13/2022 01:39 AM     Lactic Acid: No results for input(s): LACTA in the last 72 hours. BNP: No results for input(s): PROBNP in the last 72 hours.   UA:  Lab Results   Component Value Date/Time    NITRU NEGATIVE 03/03/2023 06:33 AM    COLORU COLORLESS 03/03/2023 06:33 AM    WBCUA 110 04/12/2022 06:35 PM    RBCUA 4 04/12/2022 06:35 PM    MUCUS RARE 04/12/2022 06:35 PM    BACTERIA MANY 04/12/2022 06:35 PM    CLARITYU CLEAR 03/03/2023 06:33 AM    SPECGRAV <1.005 03/03/2023 06:33 AM    LEUKOCYTESUR NEGATIVE 03/03/2023 06:33 AM    UROBILINOGEN 0.2 03/03/2023 06:33 AM    BILIRUBINUR NEGATIVE 03/03/2023 06:33 AM    BLOODU NEGATIVE 03/03/2023 06:33 AM    KETUA NEGATIVE 03/03/2023 06:33 AM     Urine Cultures: No results found for: LABURIN  Blood Cultures: No results found for: BC  No results found for: BLOODCULT2  Organism: No results found for: ORG    Time Spent Discharging patient 35 minutes    Electronically signed by Saint Schmid, MD on 3/5/2023 at 1:25 PM

## 2023-03-05 NOTE — PLAN OF CARE
Problem: Discharge Planning  Goal: Discharge to home or other facility with appropriate resources  Outcome: Completed   Discharged

## 2023-03-05 NOTE — PROCEDURES
ROUTINE ELECTROENCEPHALOGRAM (EEG) REPORT    Identifying Information:  Name: Eulogio Wen  MRN: [id number]  : 1945  Interpreting Physician: Sacha Skinner DO  Referring Provider: Lyubov FRAIRE  EEG date: 3/3/23      Clinical History:  Eulogio Wen is an 78 y.o. male , who was admitted on 3/3/2023 for seizure like activity, is being evaluated for underlying seizure disorder. Past Medical History:  Past Medical History:   Diagnosis Date    Hyperlipidemia     Hypertension         Current Medications:    donepezil  5 mg Oral Daily    fenofibrate  160 mg Oral Daily    levothyroxine  50 mcg Oral Daily    tamsulosin  0.4 mg Oral Daily    atorvastatin  40 mg Oral Nightly    sodium chloride flush  5-40 mL IntraVENous 2 times per day    enoxaparin  40 mg SubCUTAneous QPM    aspirin  81 mg Oral Daily    clopidogrel  75 mg Oral Daily    levETIRAcetam  500 mg IntraVENous Q12H          Technical Summary: The background consists of 6-7Hz activity in the theta frequency range, although at times reaching 8 Hz. It is symmetric,  normal voltage and continuous. Posterior dominant rhythm (PDR) is present and it is reactive to stimulation. The recording is remarkable for the presence of:   Continuous focal slowing in the form of polymorphic theta activity throughout without epileptiform activity or seizure activity. Patient did sleep during the recording which was characterized by poorly developed sleep architecture. EEG Interpretation: This EEG is abnormal due to the presence of:   Mild generalized slowing although at times did reach a normal rate for age at times. This is a non-specific finding but is consistent with a generalized disturbance of cerebral function. It may be seen in a variety of conditions, such as toxic, metabolic, post-anoxic, multi-focal or diffuse structural abnormalities or even post ictal changes. Normal sleep architecture was seen briefly during the recording.    No electrographic seizures or non-convulsive status epilepticus was seen over the entire monitoring period. Clinical correlation recommended. If warranted, could consider a longer duration study on an outpatient basis such as an ambulatory EEG.      Manju Robledo DO   Neurologist  3/5/2023 8:45 AM

## 2023-03-06 LAB
EKG ATRIAL RATE: 61 BPM
EKG DIAGNOSIS: NORMAL
EKG P AXIS: -14 DEGREES
EKG P-R INTERVAL: 196 MS
EKG Q-T INTERVAL: 428 MS
EKG QRS DURATION: 88 MS
EKG QTC CALCULATION (BAZETT): 430 MS
EKG R AXIS: 163 DEGREES
EKG T AXIS: 141 DEGREES
EKG VENTRICULAR RATE: 61 BPM

## 2023-03-06 PROCEDURE — 93010 ELECTROCARDIOGRAM REPORT: CPT | Performed by: INTERNAL MEDICINE

## 2023-03-07 ENCOUNTER — OFFICE VISIT (OUTPATIENT)
Dept: CARDIOLOGY CLINIC | Age: 78
End: 2023-03-07
Payer: COMMERCIAL

## 2023-03-07 VITALS
DIASTOLIC BLOOD PRESSURE: 60 MMHG | HEART RATE: 62 BPM | HEIGHT: 71 IN | WEIGHT: 166 LBS | BODY MASS INDEX: 23.24 KG/M2 | SYSTOLIC BLOOD PRESSURE: 120 MMHG

## 2023-03-07 DIAGNOSIS — R56.9 SEIZURES (HCC): ICD-10-CM

## 2023-03-07 DIAGNOSIS — I65.23 BILATERAL CAROTID ARTERY STENOSIS: ICD-10-CM

## 2023-03-07 DIAGNOSIS — E78.2 MIXED HYPERLIPIDEMIA: ICD-10-CM

## 2023-03-07 DIAGNOSIS — Z01.810 PRE-OPERATIVE CARDIOVASCULAR EXAMINATION: Primary | ICD-10-CM

## 2023-03-07 DIAGNOSIS — I10 BENIGN ESSENTIAL HYPERTENSION: Primary | ICD-10-CM

## 2023-03-07 PROBLEM — I77.9 BILATERAL CAROTID ARTERY DISEASE (HCC): Status: ACTIVE | Noted: 2023-03-07

## 2023-03-07 PROCEDURE — 3074F SYST BP LT 130 MM HG: CPT | Performed by: INTERNAL MEDICINE

## 2023-03-07 PROCEDURE — 99204 OFFICE O/P NEW MOD 45 MIN: CPT | Performed by: INTERNAL MEDICINE

## 2023-03-07 PROCEDURE — 3078F DIAST BP <80 MM HG: CPT | Performed by: INTERNAL MEDICINE

## 2023-03-07 PROCEDURE — 1123F ACP DISCUSS/DSCN MKR DOCD: CPT | Performed by: INTERNAL MEDICINE

## 2023-03-07 NOTE — PROGRESS NOTES
Vein \"LEG PROBLEM Questionnaire\"  Do you have prominent leg veins? No   Do you have any skin discoloration? No  Do you have any healed or active sores? No  Do you have swelling of the legs? No  Do you have a family history of varicose veins? No  Does your profession involve pro-longed        standing or heavy lifting? Yes, back then  7. Have you been fighting overweight problems? No  8. Do you have restless legs? No  9. Do you have any night time cramps? No  10. Do you have any of the following in your legs:         I  11. If Yes - Have they worn compression stockings No  12.  If they have worn compression stockings

## 2023-03-07 NOTE — PATIENT INSTRUCTIONS
Please be informed that if you contact our office outside of normal business hours the physician on call cannot help with any scheduling or rescheduling issues, procedure instruction questions or any type of medication issue. We advise you for any urgent/emergency that you go to the nearest emergency room! PLEASE CALL OUR OFFICE DURING NORMAL BUSINESS HOURS    Monday - Friday   8 am to 5 pm    [address] [address]: [phone]    [place]:  [phone]  **It is YOUR responsibilty to bring medication bottles and/or updated medication list to [address] [place] [address]. This will allow us to better serve you and all your healthcare needs**  [facility] Locations - No appointment necessary. Sites open Monday to Friday. Call your preferred location for test preparation, business   hours and other information you need. [facility] accepts [employer]. [address] Wanda Nap[place], [address] [facility]  Phone: [phone] Katina Manley  [address] [facility]  [address] Katina Manley, [address] [facility]  Phone: [phone]     Thank you for allowing us to care for you today! We want to ensure we can follow your treatment plan and we strive to give you the best outcomes and experience possible. If you ever have a life threatening emergency and call 911 - for an ambulance (EMS)   Our providers can only care for you at:   [facility] or [facility]. Even if you have someone take you or you drive yourself we can only care for you in a [facility] facility. Our providers are not setup at the other healthcare locations! CAROTID ARTERY DISEASE: Will get Carotid angiogram before referring for CEA. HYPERTENSION:Well controlled on Ziac    DYSLIPIDEMIA: on Lipitor    Abnormal EKG: with Carotid disease, family H/O CAD in Father & abnormal EKG will check Stress Cardiolite for further risk stratification. I spent about 30 min.  of time in review of the available data, chart Prep., interviewing patient, obtaining history, performing physical exam, going through decision making analysis for assessment & plans of management on this patient. Office Visit for test results.

## 2023-03-07 NOTE — PROGRESS NOTES
CARDIAC CONSULT NOTE       Bill  78 y.o.  male    Chief Complaint   Patient presents with    Carotid Disease    Dizziness       Referring physician:  JUNO Freeman CNP     Primary care physician:  JUNO Freeman CNP    History of Present Illness:     Suha Montero is a 78 y.o. male referred for evaluation and management of vascular disease problems. Patient was recently in hospital for a seizure ( He is on anti seizure medication now ). During W/U reveals significant Carotid artery disease. No C/O chest pain or SOB etc.     has a past medical history of Hyperlipidemia and Hypertension. has no past surgical history on file. reports that he has never smoked. He has never used smokeless tobacco. He reports current alcohol use of about 3.0 standard drinks per week. He reports that he does not use drugs. family history includes Coronary Art Dis in his father; High Cholesterol in his father and mother; Hypertension in his father; Tuberculosis in his mother. Review of Systems:   Cardiovascular: No chest pain, dyspnea on exertion, palpitations or loss of consciousness  Respiratory: No cough or wheezing    Musculoskeletal:  No gait disturbance, weakness, muscle cramps, aches & pains or joint complaints  Neurological: No TIA or stroke symptoms  Psychiatric: No anxiety or depression  Hematologic/Lymphatic: No bleeding problems, blood clots or swollen lymph nodes    Physical Examination:    /60 (Site: Left Upper Arm, Position: Sitting, Cuff Size: Medium Adult)   Pulse 62   Ht 5' 11\" (1.803 m)   Wt 166 lb (75.3 kg)   BMI 23.15 kg/m²    Wt Readings from Last 3 Encounters:   03/07/23 166 lb (75.3 kg)   03/05/23 164 lb 9 oz (74.6 kg)   03/03/23 160 lb 15 oz (73 kg)     Body mass index is 23.15 kg/m². General Appearance:  Non-obese/Well Nourished  1. Skin: It is warm & dry. No rashes noted. 2. Eyes: No conjunctival Pallor seen. No jaundice noted.   3. Neck: is supple there is no elevation of JVD. No thyromegaly  4. Respiratory:  Resp Assessment: No abnormal findings. Resp Auscultation: Vesicular breath sounds without rales or wheezing. 5. Cardiovascular: Auscultation: Normal S1 & S2, No prominent murmurs  Carotid Arteries: No bruits present  Abdominal Aorta: Non-palpable  Pedal Pulses: 2+ and equal   6. Abdomen:  No masses or tenderness  Liver/Spleen: No Abnormalities Noted, no organomegaly. 7. Musculoskeletal: No joint deformities. No muscle wasting  8. Extremities:   No Cyanosis or Clubbing. No significant edema   9. Rectal / genital:   Deferred  10.  Neurological/Psychiatric:  Oriented to time, place, and person  Non-anxious    Lab Results   Component Value Date/Time    CKTOTAL 118 04/12/2022 03:41 PM    TROPONINT <0.010 03/03/2023 03:54 AM    TROPONINT <0.010 04/13/2022 07:10 AM     BNP:    Lab Results   Component Value Date/Time    PROBNP 526.2 04/12/2022 03:41 PM     PT/INR:  No results found for: PTINR  Lab Results   Component Value Date    LABA1C 6.4 (H) 03/03/2023     Lab Results   Component Value Date    CHOL 172 03/03/2023    TRIG 130 03/03/2023    HDL 24 (L) 03/03/2023    LDLCALC 122 (H) 03/03/2023     Lab Results   Component Value Date    ALT 33 04/12/2022    AST 33 04/12/2022     BMP:    Lab Results   Component Value Date/Time     03/04/2023 04:02 AM     03/03/2023 03:54 AM    K 4.2 03/04/2023 04:02 AM    K 4.0 03/03/2023 03:54 AM     03/04/2023 04:02 AM    CL 97 03/03/2023 03:54 AM    CO2 27 03/04/2023 04:02 AM    CO2 22 03/03/2023 03:54 AM    BUN 17 03/04/2023 04:02 AM    BUN 18 03/03/2023 03:54 AM    CREATININE 1.2 03/04/2023 04:02 AM    CREATININE 1.2 03/03/2023 03:54 AM     CMP:    Lab Results   Component Value Date/Time     03/04/2023 04:02 AM     03/03/2023 03:54 AM    K 4.2 03/04/2023 04:02 AM    K 4.0 03/03/2023 03:54 AM     03/04/2023 04:02 AM    CL 97 03/03/2023 03:54 AM    CO2 27 03/04/2023 04:02 AM CO2 22 03/03/2023 03:54 AM    BUN 17 03/04/2023 04:02 AM    BUN 18 03/03/2023 03:54 AM    CREATININE 1.2 03/04/2023 04:02 AM    CREATININE 1.2 03/03/2023 03:54 AM    PROT 7.9 04/12/2022 03:41 PM     TSH:    Lab Results   Component Value Date/Time    TSHHS 1.090 03/03/2023 06:33 AM    TSHHS 1.220 04/12/2022 03:41 PM       Echo:  3/3/2023   Left ventricular systolic function is normal.   Ejection fraction is visually estimated at 55%. Sclerotic, but non-stenotic aortic valve. No evidence of any pericardial effusion. Negative bubble study; no evidence of PFO or ASD. Stress Cardiolyte: not done     EKG: 3/3/2023  Normal sinus rhythm  Right axis deviation  Nonspecific ST abnormality  Abnormal ECG  When compared with ECG of 03-MAR-2023 04:18,  QRS axis shifted right  T wave inversion now evident in Lateral leads     Carotid: 3/3/2023  Evidence of 50% stenosis due to calcified plaques at the origin of the right   internal carotid artery. Evidence of severe stenosis, which is about 80% stenosis due to prominent   calcified plaques at the origin of the left internal carotid artery. In the rest of bilateral carotid arterial systems and vertebral arteries in   neck there is no additional significant stenosis or dissection. Bilateral   vertebral arteries are of small size. QUALITY MEASURES REVIEWED:  1.CAD:Patient is taking anti platelet agent:Yes  Patient does not have Hx of documented CAD  2. DYSLIPIDEMIA: Patient is on cholesterol lowering medication:Yes   3. Beta-Blocker therapy for CAD, if prior Myocardial Infarction:No   4. Counselled regarding smoking cessation. No   Patient does not Smoke. 5.Anticoagulation therapy (for A.Fib) No   Does Not have A.Fib.  6.Discussed weight management strategies. Assessment, Recommendations & Tests:     CAROTID ARTERY DISEASE: Will get Carotid angiogram before referring for CEA.     HYPERTENSION:Well controlled on Ziac    DYSLIPIDEMIA: on Lipitor    Abnormal EKG: with Carotid disease, family H/O CAD in Father & abnormal EKG will check Stress Cardiolite for further risk stratification. I spent about 30 min. of time in review of the available data, chart Prep., interviewing patient, obtaining history, performing physical exam, going through decision making analysis for assessment & plans of management on this patient. Office Visit for test results.      Rachel Owen MD, 3/7/2023 11:31 AM

## 2023-03-08 ENCOUNTER — TELEPHONE (OUTPATIENT)
Dept: CARDIOLOGY CLINIC | Age: 78
End: 2023-03-08

## 2023-03-08 NOTE — TELEPHONE ENCOUNTER
Son called Dad is  set up to have Angio , he was under the impression Dr Evelyn Andrew wanted nuclear done prior to Angiogram , Nuclear is set up for 3/15

## 2023-03-08 NOTE — TELEPHONE ENCOUNTER
Per Dr Renetta Webb, need NM prior to angio.  Schedule with son in [facility]

## 2023-03-09 ENCOUNTER — HOSPITAL ENCOUNTER (OUTPATIENT)
Age: 78
Discharge: HOME | End: 2023-03-09
Payer: COMMERCIAL

## 2023-03-09 ENCOUNTER — TELEPHONE (OUTPATIENT)
Dept: CARDIOLOGY CLINIC | Age: 78
End: 2023-03-09

## 2023-03-09 ENCOUNTER — HOSPITAL ENCOUNTER (OUTPATIENT)
Dept: GENERAL RADIOLOGY | Age: 78
Discharge: HOME | End: 2023-03-09
Payer: COMMERCIAL

## 2023-03-09 ENCOUNTER — PROCEDURE VISIT (OUTPATIENT)
Dept: CARDIOLOGY CLINIC | Age: 78
End: 2023-03-09

## 2023-03-09 ENCOUNTER — SCHEDULED TELEPHONE ENCOUNTER (OUTPATIENT)
Dept: CARDIOLOGY CLINIC | Age: 78
End: 2023-03-09

## 2023-03-09 DIAGNOSIS — R56.9 SEIZURES (HCC): ICD-10-CM

## 2023-03-09 DIAGNOSIS — R06.02 SOB (SHORTNESS OF BREATH): Primary | ICD-10-CM

## 2023-03-09 DIAGNOSIS — I65.23 BILATERAL CAROTID ARTERY STENOSIS: ICD-10-CM

## 2023-03-09 DIAGNOSIS — I77.9 CAROTID ARTERY DISEASE, UNSPECIFIED LATERALITY, UNSPECIFIED TYPE (HCC): Primary | ICD-10-CM

## 2023-03-09 DIAGNOSIS — I10 BENIGN ESSENTIAL HYPERTENSION: ICD-10-CM

## 2023-03-09 DIAGNOSIS — Z01.810 PRE-OPERATIVE CARDIOVASCULAR EXAMINATION: ICD-10-CM

## 2023-03-09 DIAGNOSIS — E78.2 MIXED HYPERLIPIDEMIA: ICD-10-CM

## 2023-03-09 LAB
ABO/RH: NORMAL
ANTIBODY SCREEN: NEGATIVE
COMMENT: NORMAL
LV EF: 62 %
LVEF MODALITY: NORMAL

## 2023-03-09 PROCEDURE — 71046 X-RAY EXAM CHEST 2 VIEWS: CPT

## 2023-03-09 PROCEDURE — 86900 BLOOD TYPING SEROLOGIC ABO: CPT

## 2023-03-09 PROCEDURE — 86850 RBC ANTIBODY SCREEN: CPT

## 2023-03-09 PROCEDURE — 86901 BLOOD TYPING SEROLOGIC RH(D): CPT

## 2023-03-09 PROCEDURE — 36415 COLL VENOUS BLD VENIPUNCTURE: CPT

## 2023-03-09 NOTE — TELEPHONE ENCOUNTER
I spoke with daughter and explained that Nuclear stress test was abnormal and Dr. Darnell Zepeda recommends patient have a heart cath. She discussed with family & patient and decided to have [facility] done with Carotid Angiogram.  Dr. Darnell Zepeda and Trent Turpin spoke and Dr. Trent Turpin agreed to do the [facility] before the carotid on 3/13/23. Cath lab notified. Prior Auth notified.

## 2023-03-13 ENCOUNTER — HOSPITAL ENCOUNTER (OUTPATIENT)
Dept: CARDIAC CATH/INVASIVE PROCEDURES | Age: 78
Discharge: HOME | End: 2023-03-13
Attending: INTERNAL MEDICINE | Admitting: INTERNAL MEDICINE
Payer: COMMERCIAL

## 2023-03-13 VITALS
HEIGHT: 71 IN | HEART RATE: 53 BPM | DIASTOLIC BLOOD PRESSURE: 60 MMHG | OXYGEN SATURATION: 100 % | BODY MASS INDEX: 20.58 KG/M2 | TEMPERATURE: 95.7 F | WEIGHT: 147 LBS | SYSTOLIC BLOOD PRESSURE: 119 MMHG | RESPIRATION RATE: 16 BRPM

## 2023-03-13 PROBLEM — Z86.73 H/O TIA (TRANSIENT ISCHEMIC ATTACK) AND STROKE: Status: ACTIVE | Noted: 2023-03-13

## 2023-03-13 PROBLEM — R94.39 ABNORMAL STRESS TEST: Status: ACTIVE | Noted: 2023-03-13

## 2023-03-13 LAB
ACTIVATED CLOTTING TIME, LOW RANGE: 181 SEC
ACTIVATED CLOTTING TIME, LOW RANGE: 224 SEC
ACTIVATED CLOTTING TIME, LOW RANGE: 225 SEC
ACTIVATED CLOTTING TIME, LOW RANGE: 286 SEC

## 2023-03-13 PROCEDURE — C9600 PERC DRUG-EL COR STENT SING: HCPCS

## 2023-03-13 PROCEDURE — 75710 ARTERY X-RAYS ARM/LEG: CPT

## 2023-03-13 PROCEDURE — 36223 PLACE CATH CAROTID/INOM ART: CPT

## 2023-03-13 PROCEDURE — C1874 STENT, COATED/COV W/DEL SYS: HCPCS

## 2023-03-13 PROCEDURE — 75625 CONTRAST EXAM ABDOMINL AORTA: CPT

## 2023-03-13 PROCEDURE — 2709999900 HC NON-CHARGEABLE SUPPLY

## 2023-03-13 PROCEDURE — C1887 CATHETER, GUIDING: HCPCS

## 2023-03-13 PROCEDURE — 92928 PRQ TCAT PLMT NTRAC ST 1 LES: CPT | Performed by: INTERNAL MEDICINE

## 2023-03-13 PROCEDURE — 6360000002 HC RX W HCPCS

## 2023-03-13 PROCEDURE — 85347 COAGULATION TIME ACTIVATED: CPT

## 2023-03-13 PROCEDURE — 2500000003 HC RX 250 WO HCPCS

## 2023-03-13 PROCEDURE — 36223 PLACE CATH CAROTID/INOM ART: CPT | Performed by: INTERNAL MEDICINE

## 2023-03-13 PROCEDURE — 93458 L HRT ARTERY/VENTRICLE ANGIO: CPT | Performed by: INTERNAL MEDICINE

## 2023-03-13 PROCEDURE — C1769 GUIDE WIRE: HCPCS

## 2023-03-13 PROCEDURE — C1725 CATH, TRANSLUMIN NON-LASER: HCPCS

## 2023-03-13 PROCEDURE — 36222 PLACE CATH CAROTID/INOM ART: CPT | Performed by: INTERNAL MEDICINE

## 2023-03-13 PROCEDURE — 6370000000 HC RX 637 (ALT 250 FOR IP)

## 2023-03-13 PROCEDURE — C1894 INTRO/SHEATH, NON-LASER: HCPCS

## 2023-03-13 PROCEDURE — 6360000004 HC RX CONTRAST MEDICATION

## 2023-03-13 PROCEDURE — 2580000003 HC RX 258

## 2023-03-13 PROCEDURE — 93458 L HRT ARTERY/VENTRICLE ANGIO: CPT

## 2023-03-13 RX ORDER — ACETAMINOPHEN 325 MG/1
650 TABLET ORAL EVERY 4 HOURS PRN
Status: DISCONTINUED | OUTPATIENT
Start: 2023-03-13 | End: 2023-03-13 | Stop reason: HOSPADM

## 2023-03-13 RX ORDER — SODIUM CHLORIDE 0.9 % (FLUSH) 0.9 %
5-40 SYRINGE (ML) INJECTION EVERY 12 HOURS SCHEDULED
Status: DISCONTINUED | OUTPATIENT
Start: 2023-03-13 | End: 2023-03-13 | Stop reason: HOSPADM

## 2023-03-13 RX ORDER — SODIUM CHLORIDE 9 MG/ML
INJECTION, SOLUTION INTRAVENOUS PRN
Status: DISCONTINUED | OUTPATIENT
Start: 2023-03-13 | End: 2023-03-13 | Stop reason: HOSPADM

## 2023-03-13 RX ORDER — ONDANSETRON 2 MG/ML
4 INJECTION INTRAMUSCULAR; INTRAVENOUS EVERY 6 HOURS PRN
Status: DISCONTINUED | OUTPATIENT
Start: 2023-03-13 | End: 2023-03-13 | Stop reason: HOSPADM

## 2023-03-13 RX ORDER — BISOPROLOL FUMARATE AND HYDROCHLOROTHIAZIDE 10; 6.25 MG/1; MG/1
0.5 TABLET ORAL DAILY
Qty: 30 TABLET | Refills: 3 | Status: SHIPPED | OUTPATIENT
Start: 2023-03-13

## 2023-03-13 RX ORDER — SODIUM CHLORIDE 9 MG/ML
INJECTION, SOLUTION INTRAVENOUS CONTINUOUS
Status: DISCONTINUED | OUTPATIENT
Start: 2023-03-13 | End: 2023-03-13 | Stop reason: HOSPADM

## 2023-03-13 RX ORDER — CLOPIDOGREL BISULFATE 75 MG/1
75 TABLET ORAL DAILY
Qty: 90 TABLET | Refills: 3 | Status: SHIPPED | OUTPATIENT
Start: 2023-03-13

## 2023-03-13 RX ORDER — CLOPIDOGREL BISULFATE 75 MG/1
75 TABLET ORAL DAILY
Status: DISCONTINUED | OUTPATIENT
Start: 2023-03-14 | End: 2023-03-13 | Stop reason: HOSPADM

## 2023-03-13 RX ORDER — SODIUM CHLORIDE 0.9 % (FLUSH) 0.9 %
5-40 SYRINGE (ML) INJECTION PRN
Status: DISCONTINUED | OUTPATIENT
Start: 2023-03-13 | End: 2023-03-13 | Stop reason: HOSPADM

## 2023-03-13 RX ORDER — ASPIRIN 81 MG/1
81 TABLET, CHEWABLE ORAL DAILY
Status: DISCONTINUED | OUTPATIENT
Start: 2023-03-14 | End: 2023-03-13 | Stop reason: HOSPADM

## 2023-03-13 NOTE — DISCHARGE INSTRUCTIONS
Discharge Home Instuctions  Name:Alex Sebastian  NDS:8/88/2716    Your instructions:    Shower only for the first 5 days, NO TUB BATHS. Wash procedure site with mild soap, rinse and pat dry. Do not rub over the procedure site for two (2) days. Remove dressing in 2 days. Site observations-Observe the area for bleeding or hematoma (lump under the skin caused by bleeding.)      A. Painless bruising is normal.  B. If a firmness/swelling seems to be increasing or there is bright red bleeding from site       (hold pressure over procedure site) and  CALL 911 IMMEDIATELY. What to do after you leave the hospital:    Recommended activity: no lifting, Driving, or Strenuous exercise for 3 days. DO NOT lift more than 10lbs. For your procedure you may have been given a sedative to help you relax. This drug will make you sleepy. It is usually given in a vein (by IV). Don't do anything for 24 hours that requires attention to detail. It takes time for the medicine effects to completely wear off. Do not sign any legally binding contracts or documents over the next 24 hours. For your safety, you should not drive or operate any machinery that could be dangerous until the medicine wears off and you can think clearly and react easily. Follow-up with physician in 7-10 days. Take your medication as ordered.       If you have any questions, you may call [phone] or your physicians office

## 2023-03-13 NOTE — PLAN OF CARE
Spoke to the outpatient pharmacy in regards to the scripts written today by Dr. Tatianna Cheney. New scripts being sent to the [facility] pharmacy of patients choice to refill once out of the current prescription just received on 3/4. Daughter at bedside and aware of scripts being sent.  [facility], [address] [place] [address]  3/13/2023

## 2023-03-13 NOTE — H&P
Martine Rivera, 78 y.o., male    Primary care physician:  Deya Edge, APRN - CNP     Chief Complaint: Chest Pain/CVA     History of Present Illness:  [facility] and Carotid angio due to recent stroke and abnormal stress test     Past medical history:    has a past medical history of Hyperlipidemia and Hypertension. Past surgical history:   has no past surgical history on file. Social History:   reports that he has never smoked. He has never used smokeless tobacco. He reports current alcohol use of about 3.0 standard drinks per week. He reports that he does not use drugs. Family history:  family history includes Coronary Art Dis in his father; High Cholesterol in his father and mother; Hypertension in his father; Tuberculosis in his mother. Allergies:    No Known Allergies    Home Medications:  Prior to Admission medications    Medication Sig Start Date End Date Taking?  Authorizing Provider   aspirin 81 MG chewable tablet Take 1 tablet by mouth daily 3/6/23   Kennedi Mcneil MD   atorvastatin (LIPITOR) 40 MG tablet Take 1 tablet by mouth nightly 3/5/23   Kennedi Mcneil MD   clopidogrel (PLAVIX) 75 MG tablet Take 1 tablet by mouth daily for 18 doses 3/6/23 3/24/23  Kennedi Mcneil MD   donepezil (ARICEPT) 5 MG tablet Take 5 mg by mouth daily 12/22/22   Historical Provider, MD   tamsulosin (FLOMAX) 0.4 MG capsule Take 0.4 mg by mouth daily 2/7/23   Historical Provider, MD   fenofibrate (TRIGLIDE) 160 MG tablet Take 160 mg by mouth daily    Historical Provider, MD   bisoprolol-hydroCHLOROthiazide (Iglesias Crispin) 10-6.25 MG per tablet Take 1 tablet by mouth daily    Historical Provider, MD   levothyroxine (SYNTHROID) 50 MCG tablet Take 1 tablet by mouth daily 2/4/22   Historical Provider, MD       Review of systems: Review of Systems:   Constitutional: No Fever or Weight Loss   Eyes: No Decreased Vision  ENT: No Headaches, Hearing Loss or Vertigo  Cardiovascular: No chest pain, dyspnea on exertion, palpitations or loss of consciousness  Respiratory: No cough or wheezing    Gastrointestinal: No abdominal pain, appetite loss, blood in stools, constipation, diarrhea or heartburn  Genitourinary: No dysuria, trouble voiding, or hematuria  Musculoskeletal:  No gait disturbance, weakness or joint complaints  Integumentary: No rash or pruritis  Neurological: No TIA or stroke symptoms  Psychiatric: No anxiety or depression  Endocrine: No malaise, fatigue or temperature intolerance  Hematologic/Lymphatic: No bleeding problems, blood clots or swollen lymph nodes  Allergic/Immunologic: No nasal congestion or hives    Physical Examination:    /77   Pulse 59   Temp (!) 95.7 °F (35.4 °C) (Temporal)   Resp 19   Ht 5' 11\" (1.803 m)   Wt 147 lb (66.7 kg)   SpO2 99%   BMI 20.50 kg/m²      General Appearance:  No distress, conversant  Constitutional:  Well developed, Well nourished, No acute distress, Non-toxic appearance. HENT:  Normocephalic, Atraumatic, Bilateral external ears normal, Oropharynx moist, No oral exudates, Nose normal. Neck- Normal range of motion, No tenderness, Supple, No stridor,no apical-carotid delay  Eyes:  PERRL, EOMI, Conjunctiva normal, No discharge. Lymphatics: no palpable lymph nodes  Respiratory:  Normal breath sounds, No respiratory distress, No wheezing, No chest tenderness. ,no uise of accessory muscles, JVP not elevated  Cardiovascular: (PMI) apex non displaced,no lifts no thrills, no s3,no s4, Normal heart rate, Normal rhythm, No murmurs, No rubs, No gallops. GI:  Bowel sounds normal, Soft, No tenderness, No masses, No pulsatile masses, no hepatosplenomegally, no bruits  Musculoskeletal:  Intact distal pulses, No edema, No tenderness, No cyanosis, No clubbing. Good range of motion in all major joints. No tenderness to palpation or major deformities noted. Back- No tenderness. Integument:  Warm, Dry, No erythema, No rash.    Skin: no rash, no ulcers  Lymphatic:  No lymphadenopathy noted. Neurologic:  Alert & oriented x 3, Normal motor function, Normal sensory function, No focal deficits noted. Lab Review   No results for input(s): WBC, HGB, HCT, PLT in the last 72 hours. No results for input(s): NA, K, CL, CO2, PHOS, BUN, CREATININE, CA in the last 72 hours. No results for input(s): AST, ALT, ALB, BILIDIR, BILITOT, ALKPHOS in the last 72 hours. No results for input(s): TROPONINI in the last 72 hours. Lab Results   Component Value Date    INR 1.14 03/03/2023    PROTIME 14.7 (H) 03/03/2023     No results found for: BNP      Assessment:    Active Problems:    * No active hospital problems. *     ASA : 2 , Mallampati class II  Plan:   1. Chest Pain/ Possible Angina: we will plan carotid angiogram  Alternate and risks versus benefits were discussed in detail. We will plan cardiac catheterization. We  discussed the risk of but not limited to  potential kidney failure, emergent surgery,blood transfusion  transfusion, infection, potential even death.   Patient is in agreement and wants to proceed

## 2023-03-13 NOTE — PLAN OF CARE
All discharge instructions explained to the patient at this time. No bleeding or hematoma noted along site. Patient IV removed per discharge orders. Patient denies any additional needs and all vitals stable for discharge home.   [facility] 3/13/2023

## 2023-03-13 NOTE — PLAN OF CARE
Sheath removed by Brooke Marquez RN at this time. Patient tolerating well and no bleeding or hematoma noted. Manual pressure continues.  [facility], [address] [place] [address] 3/13/2023

## 2023-03-13 NOTE — PROGRESS NOTES
Outpatient Pharmacy Progress Note for Meds-to-Beds    Total number of Prescriptions Filled: 0    Additional Documentation: The following prescription(s) were refilled to soon per insurance (patient has some at home): Clopidogrel and Bisoprolol-HCTZ. Patient filled these at [facility] last week. Prescriptions transferred to [facility] (Mary Ellen Bryan) so they can be profiled. Thank you for letting us serve your patients.   [facility]    [address], [address] [facility]    Phone: [phone]    Fax: [phone]

## 2023-03-13 NOTE — FLOWSHEET NOTE
03/13/23 1015   Puncture Site Assessment 1   Location Femoral - right  (#6F RFA sheath intact and to pressure bag)   Site Assessment No redness, drainage, swelling or hematoma   Dressing Applied Transparent occlusive dressing

## 2023-03-20 ENCOUNTER — OFFICE VISIT (OUTPATIENT)
Dept: CARDIOLOGY CLINIC | Age: 78
End: 2023-03-20
Payer: COMMERCIAL

## 2023-03-20 VITALS
WEIGHT: 169 LBS | HEIGHT: 71 IN | HEART RATE: 53 BPM | BODY MASS INDEX: 23.66 KG/M2 | SYSTOLIC BLOOD PRESSURE: 126 MMHG | DIASTOLIC BLOOD PRESSURE: 66 MMHG

## 2023-03-20 DIAGNOSIS — E78.2 MIXED HYPERLIPIDEMIA: ICD-10-CM

## 2023-03-20 DIAGNOSIS — Z86.73 H/O TIA (TRANSIENT ISCHEMIC ATTACK) AND STROKE: ICD-10-CM

## 2023-03-20 DIAGNOSIS — I65.23 BILATERAL CAROTID ARTERY STENOSIS: Primary | ICD-10-CM

## 2023-03-20 DIAGNOSIS — I10 BENIGN ESSENTIAL HYPERTENSION: ICD-10-CM

## 2023-03-20 DIAGNOSIS — R73.03 PREDIABETES: ICD-10-CM

## 2023-03-20 DIAGNOSIS — R94.39 ABNORMAL STRESS TEST: ICD-10-CM

## 2023-03-20 PROCEDURE — 99213 OFFICE O/P EST LOW 20 MIN: CPT | Performed by: INTERNAL MEDICINE

## 2023-03-20 PROCEDURE — 93000 ELECTROCARDIOGRAM COMPLETE: CPT | Performed by: INTERNAL MEDICINE

## 2023-03-20 PROCEDURE — 1124F ACP DISCUSS-NO DSCNMKR DOCD: CPT | Performed by: INTERNAL MEDICINE

## 2023-03-20 PROCEDURE — 3074F SYST BP LT 130 MM HG: CPT | Performed by: INTERNAL MEDICINE

## 2023-03-20 PROCEDURE — 3078F DIAST BP <80 MM HG: CPT | Performed by: INTERNAL MEDICINE

## 2023-03-20 NOTE — LETTER
March 20, 2023      [facility]  [address] [address]      Patient: Lela Da Silva   MR Number: [id number]   YOB: 1945   Date of Visit: 3/20/2023       Dear Reginaldo Moreno: Thank you for referring Lela Da Silva to me for evaluation/treatment. Below are the relevant portions of my assessment and plan of care. If you have questions, please do not hesitate to call me. I look forward to following Bill along with you.     Sincerely,        Jazmine Garrison MD

## 2023-03-20 NOTE — PATIENT INSTRUCTIONS
Lipitor. Does not tolerate medications well due to side effects  See most recent Lab values:( Reviewed Labs from family MD     )  LDL is 122  HDL is 24    CAROTID ARTERY DISEASE:none by angiography  Carotid: 3/3/2023  Evidence of 50% stenosis due to calcified plaques at the origin of the right   internal carotid artery. Evidence of severe stenosis, which is about 80% stenosis due to prominent   calcified plaques at the origin of the left internal carotid artery. In the rest of bilateral carotid arterial systems and vertebral arteries in   neck there is no additional significant stenosis or dissection. Bilateral   vertebral arteries are of small size. ?     TESTS ORDERED: ETT     PREVIOUSLY ORDERED TESTS REVIEWED & DISCUSSED WITH THE PATIENT:     I personally reviewed & interpreted, all previously ordered tests as copied above. Latest Labs are pulled in to the note with dates. Labs, specially in Reference to Lipid profile, Cardiac testing in the form of Echo ( dated: ), stress tests ( dated: ) & other relevant cardiac testing reviewed with patient & recommendations made based on assessment of the results. Discussed role of Cardiac risk factors & effects + treatment of co morbidities with patient & advised accordingly. MEDICATIONS: List of medications patient is currently taking is reviewed in detail with the patient & family member present. Discussed any side effects or problems taking the medication. Recommend Continue present management & medications as listed. EXCEPT HOLD FENOFIBRATE FOR NOW. Refer to Cardiac Rehab. AFFIRMATION: I spent at least 20 minutes of time reviewing patient's history, previous & current medical problems & all Labs + testing. This includes chart prep even prior to the vosit. Various goals are discussed and multiple questions answered. Relevant concelling performed. Office follow up in 3 months.

## 2023-03-20 NOTE — PROGRESS NOTES
Vein \"LEG PROBLEM Questionnaire\"  Do you have prominent leg veins? No   Do you have any skin discoloration? No  Do you have any healed or active sores? No  Do you have swelling of the legs? No  Do you have a family history of varicose veins? No  Does your profession involve pro-longed        standing or heavy lifting? Yes, back then  7. Have you been fighting overweight problems? No  8. Do you have restless legs? No  9. Do you have any night time cramps? No  10. Do you have any of the following in your legs:         I  11. If Yes - Have they worn compression stockings No  12.  If they have worn compression stockings
of breath  Musculoskeletal: Negative for muscle pain, muscular weakness, negative for pain in arm and leg or swelling in foot and leg    Objective:  /66 (Site: Left Upper Arm, Position: Sitting, Cuff Size: Medium Adult)   Pulse 53   Ht 5' 11\" (1.803 m)   Wt 169 lb (76.7 kg)   BMI 23.57 kg/m²   Wt Readings from Last 3 Encounters:   03/20/23 169 lb (76.7 kg)   03/13/23 147 lb (66.7 kg)   03/07/23 166 lb (75.3 kg)     Body mass index is 23.57 kg/m². GENERAL - Alert, oriented, pleasant, in no apparent distress. EYES: No jaundice, no conjunctival pallor. Neck - Supple. No jugular venous distention noted. No carotid bruits. Cardiovascular - Normal S1 and S2 without obvious murmur or gallop. Extremities - No cyanosis, clubbing, or significant edema. Pulmonary - No respiratory distress. No wheezes or rales.       MEDICAL DECISION MAKING & DATA REVIEW:    Lab Review   Lab Results   Component Value Date/Time    TROPONINT <0.010 03/03/2023 03:54 AM    TROPONINT <0.010 04/13/2022 07:10 AM     Lab Results   Component Value Date/Time    PROBNP 526.2 04/12/2022 03:41 PM     Lab Results   Component Value Date    INR 1.14 03/03/2023     Lab Results   Component Value Date    LABA1C 6.4 (H) 03/03/2023     Lab Results   Component Value Date    WBC 12.9 (H) 03/03/2023    WBC 11.7 (H) 04/16/2022    HCT 36.2 (L) 03/03/2023    HCT 38.4 (L) 04/16/2022    MCV 90.3 03/03/2023    MCV 93.4 04/16/2022     03/03/2023     04/16/2022     Lab Results   Component Value Date    CHOL 172 03/03/2023    TRIG 130 03/03/2023    HDL 24 (L) 03/03/2023    LDLCALC 122 (H) 03/03/2023     Lab Results   Component Value Date    ALT 33 04/12/2022    AST 33 04/12/2022     BMP:    Lab Results   Component Value Date/Time     03/04/2023 04:02 AM     03/03/2023 03:54 AM    K 4.2 03/04/2023 04:02 AM    K 4.0 03/03/2023 03:54 AM     03/04/2023 04:02 AM    CL 97 03/03/2023 03:54 AM    CO2 27 03/04/2023 04:02 AM    CO2

## 2023-03-28 ENCOUNTER — PROCEDURE VISIT (OUTPATIENT)
Dept: CARDIOLOGY CLINIC | Age: 78
End: 2023-03-28
Payer: COMMERCIAL

## 2023-03-28 DIAGNOSIS — Z86.73 H/O TIA (TRANSIENT ISCHEMIC ATTACK) AND STROKE: ICD-10-CM

## 2023-03-28 DIAGNOSIS — R73.03 PREDIABETES: ICD-10-CM

## 2023-03-28 DIAGNOSIS — I65.23 BILATERAL CAROTID ARTERY STENOSIS: ICD-10-CM

## 2023-03-28 DIAGNOSIS — E78.2 MIXED HYPERLIPIDEMIA: ICD-10-CM

## 2023-03-28 DIAGNOSIS — Z98.61 S/P PTCA (PERCUTANEOUS TRANSLUMINAL CORONARY ANGIOPLASTY): Primary | ICD-10-CM

## 2023-03-28 DIAGNOSIS — I10 BENIGN ESSENTIAL HYPERTENSION: ICD-10-CM

## 2023-03-28 DIAGNOSIS — R94.39 ABNORMAL STRESS TEST: ICD-10-CM

## 2023-03-28 PROCEDURE — 93015 CV STRESS TEST SUPVJ I&R: CPT | Performed by: INTERNAL MEDICINE

## 2023-04-05 ENCOUNTER — HOSPITAL ENCOUNTER (OUTPATIENT)
Dept: CARDIAC REHAB | Age: 78
Setting detail: THERAPIES SERIES
Discharge: HOME | End: 2023-04-05
Payer: COMMERCIAL

## 2023-04-05 PROCEDURE — G0423 INTENS CARDIAC REHAB NO EXER: HCPCS

## 2023-04-05 PROCEDURE — G0422 INTENS CARDIAC REHAB W/EXERC: HCPCS

## 2023-04-10 ENCOUNTER — APPOINTMENT (OUTPATIENT)
Dept: CARDIAC REHAB | Age: 78
End: 2023-04-10
Payer: COMMERCIAL

## 2023-04-11 ENCOUNTER — APPOINTMENT (OUTPATIENT)
Dept: CARDIAC REHAB | Age: 78
End: 2023-04-11
Payer: COMMERCIAL

## 2023-04-13 ENCOUNTER — APPOINTMENT (OUTPATIENT)
Dept: CARDIAC REHAB | Age: 78
End: 2023-04-13
Payer: COMMERCIAL

## 2023-04-17 ENCOUNTER — APPOINTMENT (OUTPATIENT)
Dept: CARDIAC REHAB | Age: 78
End: 2023-04-17
Payer: COMMERCIAL

## 2023-04-18 ENCOUNTER — APPOINTMENT (OUTPATIENT)
Dept: CARDIAC REHAB | Age: 78
End: 2023-04-18
Payer: COMMERCIAL

## 2023-04-20 ENCOUNTER — APPOINTMENT (OUTPATIENT)
Dept: CARDIAC REHAB | Age: 78
End: 2023-04-20
Payer: COMMERCIAL

## 2023-04-24 ENCOUNTER — APPOINTMENT (OUTPATIENT)
Dept: CARDIAC REHAB | Age: 78
End: 2023-04-24
Payer: COMMERCIAL

## 2023-04-25 ENCOUNTER — OFFICE VISIT (OUTPATIENT)
Dept: NEUROLOGY | Age: 78
End: 2023-04-25

## 2023-04-25 ENCOUNTER — APPOINTMENT (OUTPATIENT)
Dept: CARDIAC REHAB | Age: 78
End: 2023-04-25
Payer: COMMERCIAL

## 2023-04-25 VITALS
HEART RATE: 61 BPM | HEIGHT: 71 IN | OXYGEN SATURATION: 96 % | DIASTOLIC BLOOD PRESSURE: 80 MMHG | BODY MASS INDEX: 24.22 KG/M2 | WEIGHT: 173 LBS | SYSTOLIC BLOOD PRESSURE: 126 MMHG

## 2023-04-25 DIAGNOSIS — Z98.61 STATUS POST PERCUTANEOUS TRANSLUMINAL CORONARY ANGIOPLASTY: ICD-10-CM

## 2023-04-25 DIAGNOSIS — R56.9 SEIZURE-LIKE ACTIVITY (HCC): Primary | ICD-10-CM

## 2023-04-25 DIAGNOSIS — I65.23 BILATERAL CAROTID ARTERY STENOSIS: ICD-10-CM

## 2023-04-25 RX ORDER — DONEPEZIL HYDROCHLORIDE 10 MG/1
10 TABLET, FILM COATED ORAL DAILY
Qty: 30 TABLET | Refills: 5 | Status: SHIPPED | OUTPATIENT
Start: 2023-04-25

## 2023-04-25 NOTE — PROGRESS NOTES
4/26/23    Jamie Cea  1945    Chief Complaint   Patient presents with    Follow-Up from Hospital     Seizure       History of Present Illness  Anahi Brady is a 78 y.o. male presenting today for hospital follow-up on 3/3/23 of:  Episode of generalized shaking consistent with seizure. This was followed by facial weakness and confusion likely secondary to postictal state and Yan's paralysis. CTA with 80% stenosis in the left ICA, 50% right ICA. Continue DAPT with aspirin and Plavix due to bilateral ICA stenosis. Continue atorvastatin for secondary stroke prevention. He is s/p PTCA. Today, he is accompanied by his daughter. He lives with her. She is mostly concerned about his memory. She did not think her father had a seizure, she thinks it was convulsive syncope. His daughter does the cooking and manages finances. Anahi Brady does stay alone during the day while she works and can use the microwave. There are no safety concerns. He denies hallucinations/delusions. He has a good appetite. He is sometimes incontinent of urine. He sleeps well, he does not snore. His PCP initiated him on Aricept 5 mg daily. He is tolerating this well. He is participating in cardiac rehab.        Current Outpatient Medications   Medication Sig Dispense Refill    donepezil (ARICEPT) 10 MG tablet Take 1 tablet by mouth daily 30 tablet 5    bisoprolol-hydroCHLOROthiazide (ZIAC) 10-6.25 MG per tablet Take 0.5 tablets by mouth daily 30 tablet 3    clopidogrel (PLAVIX) 75 MG tablet Take 1 tablet by mouth daily 90 tablet 3    aspirin 81 MG chewable tablet Take 1 tablet by mouth daily 30 tablet 3    atorvastatin (LIPITOR) 40 MG tablet Take 1 tablet by mouth nightly 30 tablet 3    tamsulosin (FLOMAX) 0.4 MG capsule Take 1 capsule by mouth daily      levothyroxine (SYNTHROID) 50 MCG tablet Take 1 tablet by mouth daily      clopidogrel (PLAVIX) 75 MG tablet Take 1 tablet by mouth daily for 18 doses (Patient not taking: Reported on 4/25/2023) 30

## 2023-04-27 ENCOUNTER — APPOINTMENT (OUTPATIENT)
Dept: CARDIAC REHAB | Age: 78
End: 2023-04-27
Payer: COMMERCIAL

## 2023-04-30 ENCOUNTER — APPOINTMENT (OUTPATIENT)
Dept: CT IMAGING | Age: 78
End: 2023-04-30
Payer: COMMERCIAL

## 2023-04-30 ENCOUNTER — HOSPITAL ENCOUNTER (EMERGENCY)
Age: 78
Discharge: HOME | End: 2023-05-01
Attending: EMERGENCY MEDICINE
Payer: COMMERCIAL

## 2023-04-30 VITALS
OXYGEN SATURATION: 95 % | BODY MASS INDEX: 24.22 KG/M2 | SYSTOLIC BLOOD PRESSURE: 174 MMHG | TEMPERATURE: 98.4 F | HEART RATE: 63 BPM | RESPIRATION RATE: 16 BRPM | DIASTOLIC BLOOD PRESSURE: 83 MMHG | HEIGHT: 71 IN | WEIGHT: 173 LBS

## 2023-04-30 DIAGNOSIS — R56.9 SEIZURE (HCC): Primary | ICD-10-CM

## 2023-04-30 LAB
ALBUMIN SERPL-MCNC: 3.8 GM/DL (ref 3.4–5)
ALCOHOL SCREEN SERUM: <0.01 %WT/VOL
ALP BLD-CCNC: 61 IU/L (ref 40–129)
ALT SERPL-CCNC: 57 U/L (ref 10–40)
AMORPHOUS: ABNORMAL /HPF
AMPHETAMINES: NEGATIVE
ANION GAP SERPL CALCULATED.3IONS-SCNC: 11 MMOL/L (ref 4–16)
AST SERPL-CCNC: 40 IU/L (ref 15–37)
BACTERIA: ABNORMAL /HPF
BARBITURATE SCREEN URINE: NEGATIVE
BASOPHILS ABSOLUTE: 0 K/CU MM
BASOPHILS RELATIVE PERCENT: 0.3 % (ref 0–1)
BENZODIAZEPINE SCREEN, URINE: NEGATIVE
BILIRUB SERPL-MCNC: 0.4 MG/DL (ref 0–1)
BILIRUBIN URINE: NEGATIVE MG/DL
BLOOD, URINE: ABNORMAL
BUN SERPL-MCNC: 24 MG/DL (ref 6–23)
CALCIUM SERPL-MCNC: 9.1 MG/DL (ref 8.3–10.6)
CANNABINOID SCREEN URINE: NEGATIVE
CHLORIDE BLD-SCNC: 96 MMOL/L (ref 99–110)
CLARITY: CLEAR
CO2: 26 MMOL/L (ref 21–32)
COCAINE METABOLITE: NEGATIVE
COLOR: YELLOW
CREAT SERPL-MCNC: 1.2 MG/DL (ref 0.9–1.3)
DIFFERENTIAL TYPE: ABNORMAL
EOSINOPHILS ABSOLUTE: 0.1 K/CU MM
EOSINOPHILS RELATIVE PERCENT: 0.7 % (ref 0–3)
FENTANYL URINE: NEGATIVE
GFR SERPL CREATININE-BSD FRML MDRD: >60 ML/MIN/1.73M2
GLUCOSE SERPL-MCNC: 138 MG/DL (ref 70–99)
GLUCOSE, URINE: NEGATIVE MG/DL
HCT VFR BLD CALC: 42.9 % (ref 42–52)
HEMOGLOBIN: 14 GM/DL (ref 13.5–18)
IMMATURE NEUTROPHIL %: 0.9 % (ref 0–0.43)
KETONES, URINE: NEGATIVE MG/DL
LACTATE: 3 MMOL/L (ref 0.5–1.9)
LEUKOCYTE ESTERASE, URINE: NEGATIVE
LYMPHOCYTES ABSOLUTE: 2.3 K/CU MM
LYMPHOCYTES RELATIVE PERCENT: 15.1 % (ref 24–44)
MCH RBC QN AUTO: 29.7 PG (ref 27–31)
MCHC RBC AUTO-ENTMCNC: 32.6 % (ref 32–36)
MCV RBC AUTO: 90.9 FL (ref 78–100)
MONOCYTES ABSOLUTE: 1.8 K/CU MM
MONOCYTES RELATIVE PERCENT: 12 % (ref 0–4)
MUCUS: ABNORMAL HPF
NITRITE URINE, QUANTITATIVE: NEGATIVE
NUCLEATED RBC %: 0 %
OPIATES, URINE: NEGATIVE
OXYCODONE: NEGATIVE
PDW BLD-RTO: 14 % (ref 11.7–14.9)
PH, URINE: 6.5 (ref 5–8)
PLATELET # BLD: 299 K/CU MM (ref 140–440)
PMV BLD AUTO: 9.4 FL (ref 7.5–11.1)
POTASSIUM SERPL-SCNC: 4.2 MMOL/L (ref 3.5–5.1)
PROTEIN UA: NEGATIVE MG/DL
RBC # BLD: 4.72 M/CU MM (ref 4.6–6.2)
RBC URINE: 1 /HPF (ref 0–3)
SARS-COV-2 RDRP RESP QL NAA+PROBE: NOT DETECTED
SEGMENTED NEUTROPHILS ABSOLUTE COUNT: 10.6 K/CU MM
SEGMENTED NEUTROPHILS RELATIVE PERCENT: 71 % (ref 36–66)
SODIUM BLD-SCNC: 133 MMOL/L (ref 135–145)
SOURCE: NORMAL
SPECIFIC GRAVITY UA: 1.02 (ref 1–1.03)
SQUAMOUS EPITHELIAL: <1 /HPF
TOTAL CK: 68 IU/L (ref 38–174)
TOTAL IMMATURE NEUTOROPHIL: 0.14 K/CU MM
TOTAL NUCLEATED RBC: 0 K/CU MM
TOTAL PROTEIN: 7 GM/DL (ref 6.4–8.2)
TRICHOMONAS: ABNORMAL /HPF
UROBILINOGEN, URINE: 0.2 MG/DL (ref 0.2–1)
WBC # BLD: 14.9 K/CU MM (ref 4–10.5)
WBC UA: ABNORMAL /HPF (ref 0–2)

## 2023-04-30 PROCEDURE — 80307 DRUG TEST PRSMV CHEM ANLYZR: CPT

## 2023-04-30 PROCEDURE — 6370000000 HC RX 637 (ALT 250 FOR IP): Performed by: EMERGENCY MEDICINE

## 2023-04-30 PROCEDURE — 99284 EMERGENCY DEPT VISIT MOD MDM: CPT

## 2023-04-30 PROCEDURE — 82550 ASSAY OF CK (CPK): CPT

## 2023-04-30 PROCEDURE — 87635 SARS-COV-2 COVID-19 AMP PRB: CPT

## 2023-04-30 PROCEDURE — 83605 ASSAY OF LACTIC ACID: CPT

## 2023-04-30 PROCEDURE — 85025 COMPLETE CBC W/AUTO DIFF WBC: CPT

## 2023-04-30 PROCEDURE — 70450 CT HEAD/BRAIN W/O DYE: CPT

## 2023-04-30 PROCEDURE — 81001 URINALYSIS AUTO W/SCOPE: CPT

## 2023-04-30 PROCEDURE — 80053 COMPREHEN METABOLIC PANEL: CPT

## 2023-04-30 PROCEDURE — G0480 DRUG TEST DEF 1-7 CLASSES: HCPCS

## 2023-04-30 RX ORDER — LEVETIRACETAM 500 MG/1
1000 TABLET ORAL ONCE
Status: COMPLETED | OUTPATIENT
Start: 2023-04-30 | End: 2023-04-30

## 2023-04-30 RX ORDER — LEVETIRACETAM 500 MG/1
500 TABLET, EXTENDED RELEASE ORAL DAILY
Qty: 14 TABLET | Refills: 0 | Status: SHIPPED | OUTPATIENT
Start: 2023-04-30 | End: 2023-05-14

## 2023-04-30 RX ADMIN — LEVETIRACETAM 1000 MG: 500 TABLET, FILM COATED ORAL at 22:40

## 2023-04-30 ASSESSMENT — PAIN - FUNCTIONAL ASSESSMENT: PAIN_FUNCTIONAL_ASSESSMENT: NONE - DENIES PAIN

## 2023-05-01 ENCOUNTER — TELEPHONE (OUTPATIENT)
Dept: NEUROLOGY | Age: 78
End: 2023-05-01

## 2023-05-01 NOTE — CARE COORDINATION
CM review of pt chart for admission potential due to c/o seizure. Dr Migel Chow contacted Neurology who recommended treatment in ER, discharged with [address] and o/p follow up. POC for discharge home.  SRIKANTHRN/CM

## 2023-05-01 NOTE — ED PROVIDER NOTES
[address] ENCOUNTER      Pt Name: Aubrey Aparicio  MRN: [id number]  [place] 1945  Date of evaluation: 4/30/2023  Provider: Gloria Dutton MD    CHIEF COMPLAINT       Chief Complaint   Patient presents with    Seizures     Pt had witnessed seizure in bed with wife         HISTORY OF PRESENT ILLNESS    HPI    Nursing Notes were reviewed. This is a 78 y.o. male who presents to the emergency department with reported seizure activity. Patient has had 1 recent episode in March of similar activity that was evaluated on an inpatient basis. Patient is here with his daughter and was initially confused and somewhat disoriented on my evaluation and subsequently became more oriented and responsive. The patient lives with his daughter but spent the night with his girlfriend. He returned home today disoriented and confused. The daughter spoke with his girlfriend and was told that the patient had a seizure earlier in the day. The patient's girlfriend described tonic-clonic activity to the daughter. The patient had a recent admission in March of this year. He was evaluated on inpatient basis by neurology. He was initially treated with Keppra but was not discharged with any antiepileptic medication. Here in the emergency department, the patient says he is tired but otherwise has no complaints. Denies headache. Denies chest pain. Denies difficulty breathing. Denies nausea, vomiting, diarrhea or recent fevers.     PAST MEDICAL HISTORY     Past Medical History:   Diagnosis Date    Hyperlipidemia     Hypertension          SURGICAL HISTORY       Past Surgical History:   Procedure Laterality Date    CARDIAC CATHETERIZATION           CURRENT MEDICATIONS       Previous Medications    ASPIRIN 81 MG CHEWABLE TABLET    Take 1 tablet by mouth daily    ATORVASTATIN (LIPITOR) 40 MG TABLET    Take 1 tablet by mouth nightly

## 2023-05-01 NOTE — ED NOTES
Pt had witnessed seizure in bed, girlfriend denied he had fall or hit head.      Cornelius Hill RN  04/30/23 2004

## 2023-05-01 NOTE — ACP (ADVANCE CARE PLANNING)
Patient does not have any ACP documents/Medical Power of . LSW notes hospital will follow [place]'s Next of Kin hierarchy in the following descending order for priority:    Guardian  Spouse  [de-identified] of adult Children  Parents  [de-identified] of adult Siblings  Nearest Relative not described above    Per [place]'s Next of Kin hierarchy: Patients' 3 adult children will be [address].

## 2023-05-04 ENCOUNTER — TELEPHONE (OUTPATIENT)
Dept: NEUROLOGY | Age: 78
End: 2023-05-04

## 2023-05-09 ENCOUNTER — TELEPHONE (OUTPATIENT)
Dept: NEUROLOGY | Age: 78
End: 2023-05-09

## 2023-05-09 NOTE — TELEPHONE ENCOUNTER
Pt is going have his EEG leads will be looked up may 14th and disconnected May 16th. Pt's daughter called to inform us.

## 2023-05-10 DIAGNOSIS — R56.9 SEIZURE-LIKE ACTIVITY (HCC): Primary | ICD-10-CM

## 2023-05-10 RX ORDER — DIVALPROEX SODIUM 500 MG/1
500 TABLET, DELAYED RELEASE ORAL 2 TIMES DAILY
Qty: 60 TABLET | Refills: 5 | Status: SHIPPED | OUTPATIENT
Start: 2023-05-10

## 2023-05-17 RX ORDER — DONEPEZIL HYDROCHLORIDE 10 MG/1
TABLET, FILM COATED ORAL
Qty: 30 TABLET | Refills: 5 | OUTPATIENT
Start: 2023-05-17

## 2023-06-03 DIAGNOSIS — R56.9 SEIZURE-LIKE ACTIVITY (HCC): ICD-10-CM

## 2023-06-05 RX ORDER — DIVALPROEX SODIUM 500 MG/1
TABLET, DELAYED RELEASE ORAL
Qty: 60 TABLET | Refills: 5 | OUTPATIENT
Start: 2023-06-05

## 2023-06-20 ENCOUNTER — OFFICE VISIT (OUTPATIENT)
Dept: CARDIOLOGY CLINIC | Age: 78
End: 2023-06-20
Payer: COMMERCIAL

## 2023-06-20 VITALS
SYSTOLIC BLOOD PRESSURE: 122 MMHG | WEIGHT: 172 LBS | BODY MASS INDEX: 24.08 KG/M2 | DIASTOLIC BLOOD PRESSURE: 80 MMHG | HEART RATE: 60 BPM | HEIGHT: 71 IN

## 2023-06-20 DIAGNOSIS — I65.23 BILATERAL CAROTID ARTERY STENOSIS: ICD-10-CM

## 2023-06-20 DIAGNOSIS — I25.119 CORONARY ARTERY DISEASE INVOLVING NATIVE CORONARY ARTERY OF NATIVE HEART WITH ANGINA PECTORIS (HCC): ICD-10-CM

## 2023-06-20 DIAGNOSIS — R73.03 PREDIABETES: ICD-10-CM

## 2023-06-20 DIAGNOSIS — I25.119 CORONARY ARTERY DISEASE INVOLVING NATIVE CORONARY ARTERY OF NATIVE HEART WITH ANGINA PECTORIS (HCC): Primary | ICD-10-CM

## 2023-06-20 DIAGNOSIS — E78.2 MIXED HYPERLIPIDEMIA: ICD-10-CM

## 2023-06-20 DIAGNOSIS — I10 BENIGN ESSENTIAL HYPERTENSION: ICD-10-CM

## 2023-06-20 PROCEDURE — 1124F ACP DISCUSS-NO DSCNMKR DOCD: CPT | Performed by: INTERNAL MEDICINE

## 2023-06-20 PROCEDURE — 3079F DIAST BP 80-89 MM HG: CPT | Performed by: INTERNAL MEDICINE

## 2023-06-20 PROCEDURE — 99213 OFFICE O/P EST LOW 20 MIN: CPT | Performed by: INTERNAL MEDICINE

## 2023-06-20 PROCEDURE — 3074F SYST BP LT 130 MM HG: CPT | Performed by: INTERNAL MEDICINE

## 2023-06-20 RX ORDER — ATORVASTATIN CALCIUM 40 MG/1
40 TABLET, FILM COATED ORAL NIGHTLY
Qty: 30 TABLET | Refills: 3 | Status: SHIPPED | OUTPATIENT
Start: 2023-06-20 | End: 2023-06-21 | Stop reason: DRUGHIGH

## 2023-06-20 RX ORDER — CLOPIDOGREL BISULFATE 75 MG/1
75 TABLET ORAL DAILY
Qty: 90 TABLET | Refills: 3 | Status: SHIPPED | OUTPATIENT
Start: 2023-06-20

## 2023-06-20 NOTE — PATIENT INSTRUCTIONS
CORONARY ARTERY DISEASE:Yes  clinically stable. Patient is on optimal medical regimen ( see medication list above )  Patient is currently  asymptomatic from CAD. -changes in  treatment:   no. Patient is being treated with ASA, Plavix & Bisoprolol. Counseled regarding regular exercise & its benefits.  -Testing ordered:  yes,   Green[place]ic classification: 1     3/7/2023    Abnormal Study. Stress test changed from exercise to Krystin Cull due to patient unable to    achieve THR and BP dropped. Patient walked 5 mins and BP dropped, Dr. Debra Arvizu advised for the patient to    receive IV fluids and he spoke with Dr. Blayne Esteban about the patient. Krystin Cull    was done after BP raised to 116/62 from 92/60. Medium sized area of moderate to severe inferior wall Ischemia. Normal LV function. LVEF is 62 %. 3/13/2023 Cath & Carotid angiography. (  )   2. PCI to mid LAD for 90 % stenosis reduced to 0% using 2.75 X 18   HAYDE improved to Minal III flow   3. Circ has ostial 30-40 % stenosis, RCA has mid 30-40 %   stenosis   4. LVEDP was 7 mmHG   8. Right common carotid and Right External carotid are widely   patent   9. LEft Common carotid has 20-30 % stenosis , Left external   carotid is widely patent   Intracranial Cerebral arteries are patent. Sinus Bradycardia 53/min otherwise WNL. Could not attend Rehab due to expense. HYPERTENSION:Yes  well controlled on current medical regimen.  - changes in  treatment:   no. Patient is on Bisoprolol  Counseled regarding low salt diet, exercise & weight control. VALVULAR HEART DISEASE:no              No significant VHD noted     Echo:  3/3/2023   Left ventricular systolic function is normal.   Ejection fraction is visually estimated at 55%. Sclerotic, but non-stenotic aortic valve. No evidence of any pericardial effusion. Negative bubble study; no evidence of PFO or ASD.      DYSLIPIDEMIA: yes,   Patient's profile is at / near Goal.no  HDL is low   Tolerating

## 2023-06-20 NOTE — PROGRESS NOTES
taking: Reported on 4/25/2023)       No current facility-administered medications for this visit. Allergies: Patient has no known allergies. Review of Systems:    Constitutional: Negative for diaphoresis and fatigue  Respiratory: Negative for shortness of breath  Cardiovascular: Negative for chest pain, dyspnea on exertion, claudication, edema, irregular heartbeat, murmur, palpitations or shortness of breath  Musculoskeletal: Negative for muscle pain, muscular weakness, negative for pain in arm and leg or swelling in foot and leg    Objective:  /80   Pulse 60   Ht 5' 11\" (1.803 m)   Wt 172 lb (78 kg)   BMI 23.99 kg/m²   Wt Readings from Last 3 Encounters:   06/20/23 172 lb (78 kg)   04/30/23 173 lb (78.5 kg)   04/25/23 173 lb (78.5 kg)     Body mass index is 23.99 kg/m². GENERAL - Alert, oriented, pleasant, in no apparent distress. EYES: No jaundice, no conjunctival pallor. Neck - Supple. No jugular venous distention noted. No carotid bruits. Cardiovascular - Normal S1 and S2 without obvious murmur or gallop. Extremities - No cyanosis, clubbing, or significant edema. Pulmonary - No respiratory distress. No wheezes or rales.       MEDICAL DECISION MAKING & DATA REVIEW:    Lab Review   Lab Results   Component Value Date/Time    TROPONINT <0.010 03/03/2023 03:54 AM    TROPONINT <0.010 04/13/2022 07:10 AM     Lab Results   Component Value Date/Time    PROBNP 526.2 04/12/2022 03:41 PM     Lab Results   Component Value Date    INR 1.14 03/03/2023     Lab Results   Component Value Date    LABA1C 6.4 (H) 03/03/2023     Lab Results   Component Value Date    WBC 14.9 (H) 04/30/2023    WBC 12.9 (H) 03/03/2023    HCT 42.9 04/30/2023    HCT 36.2 (L) 03/03/2023    MCV 90.9 04/30/2023    MCV 90.3 03/03/2023     04/30/2023     03/03/2023     Lab Results   Component Value Date    CHOL 172 03/03/2023    TRIG 130 03/03/2023    HDL 24 (L) 03/03/2023    LDLCALC 122 (H) 03/03/2023     Lab Results

## 2023-06-21 DIAGNOSIS — E78.2 MIXED HYPERLIPIDEMIA: Primary | ICD-10-CM

## 2023-06-21 LAB
ALBUMIN SERPL-MCNC: 4.1 G/DL (ref 3.4–5)
ALP SERPL-CCNC: 54 U/L (ref 40–129)
ALT SERPL-CCNC: 52 U/L (ref 10–40)
AST SERPL-CCNC: 35 U/L (ref 15–37)
BILIRUB DIRECT SERPL-MCNC: <0.2 MG/DL (ref 0–0.3)
BILIRUB INDIRECT SERPL-MCNC: ABNORMAL MG/DL (ref 0–1)
BILIRUB SERPL-MCNC: 0.4 MG/DL (ref 0–1)
CHOLEST SERPL-MCNC: 191 MG/DL (ref 0–199)
HDLC SERPL-MCNC: 44 MG/DL (ref 40–60)
LDLC SERPL CALC-MCNC: 120 MG/DL
PROT SERPL-MCNC: 6.7 G/DL (ref 6.4–8.2)
TRIGL SERPL-MCNC: 134 MG/DL (ref 0–150)
VLDLC SERPL CALC-MCNC: 27 MG/DL

## 2023-06-21 RX ORDER — ATORVASTATIN CALCIUM 80 MG/1
80 TABLET, FILM COATED ORAL DAILY
Qty: 90 TABLET | Refills: 1 | Status: SHIPPED | OUTPATIENT
Start: 2023-06-21

## 2023-06-21 NOTE — TELEPHONE ENCOUNTER
Dr. Heidi Draper saw the lastest lipids lab work. . he wants the patient to take lipitor 80mg instead of 40mg. Patient can take two pills until the RX runs out and then get the new RX from the pharmacy for the 80 mg.      Patient verbally understood

## 2023-07-21 ENCOUNTER — TELEPHONE (OUTPATIENT)
Dept: NEUROLOGY | Age: 78
End: 2023-07-21

## 2023-07-21 NOTE — TELEPHONE ENCOUNTER
Patient's daughter David Colin called stating that they did not get to have the ambulatory EEG done with [facility] because they did not show up in May, but when she called trying to find out why they told her it was due to his insurance denying coverage. David Colin states that was all the info they gave, apologized for this miscommunication. Spoke with my manager Jai Delcid, emailing our contact Loraine with [facility] about this situation so we can try to keep this from happening again. Stated to David Colin she is okay per my manager to keep the appointment without that testing, and that come that appointment the provider can discuss memory issues, seizure med concerns, and whether he needs the ambulatory at this point or not. David Colin stated understanding. Emailed Loraine and [address]  [facility].

## 2023-07-25 ENCOUNTER — OFFICE VISIT (OUTPATIENT)
Dept: NEUROLOGY | Age: 78
End: 2023-07-25
Payer: COMMERCIAL

## 2023-07-25 VITALS
WEIGHT: 172 LBS | SYSTOLIC BLOOD PRESSURE: 130 MMHG | HEIGHT: 71 IN | HEART RATE: 57 BPM | OXYGEN SATURATION: 97 % | DIASTOLIC BLOOD PRESSURE: 90 MMHG | BODY MASS INDEX: 24.08 KG/M2

## 2023-07-25 DIAGNOSIS — I65.23 BILATERAL CAROTID ARTERY STENOSIS: Primary | ICD-10-CM

## 2023-07-25 DIAGNOSIS — G31.84 MILD COGNITIVE IMPAIRMENT: ICD-10-CM

## 2023-07-25 DIAGNOSIS — R56.9 SEIZURES (HCC): ICD-10-CM

## 2023-07-25 PROCEDURE — 3079F DIAST BP 80-89 MM HG: CPT | Performed by: NURSE PRACTITIONER

## 2023-07-25 PROCEDURE — 3077F SYST BP >= 140 MM HG: CPT | Performed by: NURSE PRACTITIONER

## 2023-07-25 PROCEDURE — 1124F ACP DISCUSS-NO DSCNMKR DOCD: CPT | Performed by: NURSE PRACTITIONER

## 2023-07-25 PROCEDURE — 99214 OFFICE O/P EST MOD 30 MIN: CPT | Performed by: NURSE PRACTITIONER

## 2023-07-25 RX ORDER — MEMANTINE HYDROCHLORIDE 10 MG/1
10 TABLET ORAL 2 TIMES DAILY
Qty: 60 TABLET | Refills: 5 | Status: SHIPPED | OUTPATIENT
Start: 2023-07-25

## 2023-07-25 RX ORDER — MEMANTINE HYDROCHLORIDE 5 MG/1
TABLET ORAL
Qty: 42 TABLET | Refills: 0 | Status: SHIPPED | OUTPATIENT
Start: 2023-07-25 | End: 2023-10-25

## 2023-07-25 NOTE — PROGRESS NOTES
10/25/23    Edson Mejia  1945    Chief Complaint   Patient presents with    Follow-up     Seizure like activity        History of Present Illness  Rebecca Rebolledo is a 70 y.o. male presenting today for follow-up of: mild cognitive impairment, seizure like activity. He was unable to tolerate Keppra so this was switched to Depakote 500 mg twice daily. He is tolerating this well. He has not had any further seizure-like activity since being on Depakote. He continues ASA, Plavix, statin for his bilateral carotid stenosis and he will continue to follow with CT surgery. He was unable to complete his ambulatory EEG. His last MMSE was 22/27 on 4/26/2023. His Aricept was increased last visit to 10 mg daily. He lives with his daughter. He continues to drive without difficulty. He continues to cook and clean and he is able to dress and bathe himself.   Current Outpatient Medications   Medication Sig Dispense Refill    Handicap Placard MISC by Does not apply route Expires 7/25/2028 1 each 0    memantine (NAMENDA) 10 MG tablet Take 1 tablet by mouth 2 times daily NOTE TO PHARMACY: DO NOT FILL UNTIL 5 MG RX IS COMPLETED 60 tablet 5    atorvastatin (LIPITOR) 80 MG tablet Take 1 tablet by mouth daily 90 tablet 1    clopidogrel (PLAVIX) 75 MG tablet Take 1 tablet by mouth daily 90 tablet 3    bisoprolol-hydroCHLOROthiazide (ZIAC) 10-6.25 MG per tablet Take 0.5 tablets by mouth daily 30 tablet 3    aspirin 81 MG chewable tablet Take 1 tablet by mouth daily 30 tablet 3    tamsulosin (FLOMAX) 0.4 MG capsule Take 1 capsule by mouth daily      levothyroxine (SYNTHROID) 50 MCG tablet Take 1 tablet by mouth daily      donepezil (ARICEPT) 10 MG tablet Take 1 tablet by mouth daily 30 tablet 5    divalproex (DEPAKOTE) 500 MG DR tablet Take 1 tablet by mouth 2 times daily (Patient not taking: Reported on 6/20/2023) 60 tablet 5    levETIRAcetam (KEPPRA XR) 500 MG TB24 extended release tablet Take 1 tablet by mouth daily for 14 days

## 2023-08-15 RX ORDER — MEMANTINE HYDROCHLORIDE 5 MG/1
TABLET ORAL
Qty: 42 TABLET | Refills: 0 | OUTPATIENT
Start: 2023-08-15

## 2023-08-21 RX ORDER — MEMANTINE HYDROCHLORIDE 10 MG/1
10 TABLET ORAL 2 TIMES DAILY
Qty: 60 TABLET | Refills: 5 | OUTPATIENT
Start: 2023-08-21

## 2023-09-09 ENCOUNTER — APPOINTMENT (OUTPATIENT)
Dept: CT IMAGING | Age: 78
End: 2023-09-09
Payer: COMMERCIAL

## 2023-09-09 ENCOUNTER — HOSPITAL ENCOUNTER (EMERGENCY)
Age: 78
Discharge: HOME | End: 2023-09-09
Attending: EMERGENCY MEDICINE
Payer: COMMERCIAL

## 2023-09-09 VITALS
OXYGEN SATURATION: 99 % | RESPIRATION RATE: 11 BRPM | WEIGHT: 150 LBS | HEART RATE: 60 BPM | SYSTOLIC BLOOD PRESSURE: 152 MMHG | DIASTOLIC BLOOD PRESSURE: 72 MMHG | TEMPERATURE: 98 F | HEIGHT: 71 IN | BODY MASS INDEX: 21 KG/M2

## 2023-09-09 DIAGNOSIS — T14.8XXA ABRASION: ICD-10-CM

## 2023-09-09 DIAGNOSIS — R56.9 SEIZURE (HCC): Primary | ICD-10-CM

## 2023-09-09 LAB
ANION GAP SERPL CALCULATED.3IONS-SCNC: 15 MMOL/L (ref 4–16)
BASOPHILS ABSOLUTE: 0.1 K/CU MM
BASOPHILS RELATIVE PERCENT: 0.5 % (ref 0–1)
BUN SERPL-MCNC: 19 MG/DL (ref 6–23)
CALCIUM SERPL-MCNC: 9.3 MG/DL (ref 8.3–10.6)
CHLORIDE BLD-SCNC: 102 MMOL/L (ref 99–110)
CO2: 21 MMOL/L (ref 21–32)
CREAT SERPL-MCNC: 1.1 MG/DL (ref 0.9–1.3)
DIFFERENTIAL TYPE: ABNORMAL
EKG ATRIAL RATE: 63 BPM
EKG DIAGNOSIS: NORMAL
EKG Q-T INTERVAL: 444 MS
EKG QRS DURATION: 98 MS
EKG QTC CALCULATION (BAZETT): 446 MS
EKG R AXIS: 35 DEGREES
EKG T AXIS: 61 DEGREES
EKG VENTRICULAR RATE: 61 BPM
EOSINOPHILS ABSOLUTE: 0.5 K/CU MM
EOSINOPHILS RELATIVE PERCENT: 5 % (ref 0–3)
GFR SERPL CREATININE-BSD FRML MDRD: >60 ML/MIN/1.73M2
GLUCOSE SERPL-MCNC: 119 MG/DL (ref 70–99)
HCT VFR BLD CALC: 42.6 % (ref 42–52)
HEMOGLOBIN: 13.7 GM/DL (ref 13.5–18)
IMMATURE NEUTROPHIL %: 0.3 % (ref 0–0.43)
LYMPHOCYTES ABSOLUTE: 3 K/CU MM
LYMPHOCYTES RELATIVE PERCENT: 32.6 % (ref 24–44)
MAGNESIUM: 2.4 MG/DL (ref 1.8–2.4)
MCH RBC QN AUTO: 29.5 PG (ref 27–31)
MCHC RBC AUTO-ENTMCNC: 32.2 % (ref 32–36)
MCV RBC AUTO: 91.8 FL (ref 78–100)
MONOCYTES ABSOLUTE: 1.1 K/CU MM
MONOCYTES RELATIVE PERCENT: 12.3 % (ref 0–4)
NUCLEATED RBC %: 0 %
PDW BLD-RTO: 14.4 % (ref 11.7–14.9)
PLATELET # BLD: 195 K/CU MM (ref 140–440)
PMV BLD AUTO: 10 FL (ref 7.5–11.1)
POTASSIUM SERPL-SCNC: 4.1 MMOL/L (ref 3.5–5.1)
RBC # BLD: 4.64 M/CU MM (ref 4.6–6.2)
SEGMENTED NEUTROPHILS ABSOLUTE COUNT: 4.6 K/CU MM
SEGMENTED NEUTROPHILS RELATIVE PERCENT: 49.3 % (ref 36–66)
SODIUM BLD-SCNC: 138 MMOL/L (ref 135–145)
TOTAL IMMATURE NEUTOROPHIL: 0.03 K/CU MM
TOTAL NUCLEATED RBC: 0 K/CU MM
WBC # BLD: 9.3 K/CU MM (ref 4–10.5)

## 2023-09-09 PROCEDURE — 6360000002 HC RX W HCPCS: Performed by: EMERGENCY MEDICINE

## 2023-09-09 PROCEDURE — 80048 BASIC METABOLIC PNL TOTAL CA: CPT

## 2023-09-09 PROCEDURE — 70486 CT MAXILLOFACIAL W/O DYE: CPT

## 2023-09-09 PROCEDURE — 90471 IMMUNIZATION ADMIN: CPT | Performed by: EMERGENCY MEDICINE

## 2023-09-09 PROCEDURE — 93005 ELECTROCARDIOGRAM TRACING: CPT | Performed by: EMERGENCY MEDICINE

## 2023-09-09 PROCEDURE — 93010 ELECTROCARDIOGRAM REPORT: CPT | Performed by: INTERNAL MEDICINE

## 2023-09-09 PROCEDURE — 90715 TDAP VACCINE 7 YRS/> IM: CPT | Performed by: EMERGENCY MEDICINE

## 2023-09-09 PROCEDURE — 72125 CT NECK SPINE W/O DYE: CPT

## 2023-09-09 PROCEDURE — 99284 EMERGENCY DEPT VISIT MOD MDM: CPT

## 2023-09-09 PROCEDURE — 70450 CT HEAD/BRAIN W/O DYE: CPT

## 2023-09-09 PROCEDURE — 85025 COMPLETE CBC W/AUTO DIFF WBC: CPT

## 2023-09-09 PROCEDURE — 83735 ASSAY OF MAGNESIUM: CPT

## 2023-09-09 RX ADMIN — TETANUS TOXOID, REDUCED DIPHTHERIA TOXOID AND ACELLULAR PERTUSSIS VACCINE, ADSORBED 0.5 ML: 5; 2.5; 8; 8; 2.5 SUSPENSION INTRAMUSCULAR at 08:10

## 2023-09-09 ASSESSMENT — PAIN - FUNCTIONAL ASSESSMENT: PAIN_FUNCTIONAL_ASSESSMENT: NONE - DENIES PAIN

## 2023-09-09 NOTE — DISCHARGE INSTRUCTIONS
Drink lots of water, keep taking your seizure medication  Return for worsening or concerning symptoms.

## 2023-09-09 NOTE — ED PROVIDER NOTES
CC: Seizure  Seen in room 21    HPI: This is a 70-year-old male with a past medical history of seizures who comes in for evaluation after a seizure where he struck his head. His last few seizures have been while he was in bed. He states that he was in bed today, he had a seizure and woke up on the floor in between the wall and the bed. He hit his head on the nightstand. He is on Plavix. He has a small laceration/abrasion over his nose. He does have semiregular seizures. Past medical and surgical history: reviewed  Social: denied smoking, drinking, drugs  Allergies: reviewed    Physical exam:  General: awake, alert. No acute distress. Skin: No rashes noted. HENT: NC/abrasion over the nose. Blood in the face. EOMI, PERRL. Mucous membranes moist.   Neck: Trachea midline. No midline tenderness to palpation. Chest: Symmetrical rise and fall of the chest. Normal work of breathing. Cardio: Heart regular rate rhythm. Abdomen: Soft, nontender, nondistended. Back: Active ROM. No midline tenderness to palpation. Extremities: No deformities. Pulses present. Neuro: A/O x 3. No gross focal motor deficits noted. CN II-XII grossly intact. Strength equal symmetric bilateral upper and lower extremities. --------  Medical decision making    Differential diagnosis includes but not limited to: indirect and direct pathophysiologic etiologies vascular, inflammatory, infectious, neoplastic, degenerative, deficiency, drugs, idiopathic, intoxication, iatrogenic, congenital, autoimmune, allergic, anatomic, trauma, endocrine, environmental, and metabolic. EKG interpretation by me: Junctional rhythm, rate 61. AL interval, QRS duration and QTc are appropriate. There are no ST changes concerning for acute ischemia. Is quite similar to previous. ED course: Patient seen and evaluated by me for head injury following seizure.   On arrival, the vitals are stable, on exam he does have some blood on the face, small

## 2023-09-09 NOTE — ED TRIAGE NOTES
Pt to ED via EMS after having a witnessed seizure. Pt was in bed during the seizure and fell out of bed. Per EMS, pt was found between the wall and bed, but had hit his head on the nightstand on the way down. Pt is on Plavix. Pt arrives A&O x4, with a lac to his forehead and nose, blood is covering his face. Bleeding is controlled at this time.

## 2023-09-21 RX ORDER — MEMANTINE HYDROCHLORIDE 5 MG/1
TABLET ORAL
Qty: 42 TABLET | Refills: 0 | OUTPATIENT
Start: 2023-09-21

## 2023-10-09 LAB
CHOLESTEROL: 109 MG/DL
HDLC SERPL-MCNC: 31 MG/DL
LDL CHOLESTEROL CALCULATED: 63 MG/DL
TRIGL SERPL-MCNC: 77 MG/DL
VLDLC SERPL CALC-MCNC: 15 MG/DL (ref 4–38)

## 2023-10-25 ENCOUNTER — OFFICE VISIT (OUTPATIENT)
Dept: NEUROLOGY | Age: 78
End: 2023-10-25

## 2023-10-25 VITALS
BODY MASS INDEX: 22.54 KG/M2 | OXYGEN SATURATION: 99 % | SYSTOLIC BLOOD PRESSURE: 142 MMHG | HEIGHT: 71 IN | WEIGHT: 161 LBS | HEART RATE: 55 BPM | DIASTOLIC BLOOD PRESSURE: 82 MMHG

## 2023-10-25 DIAGNOSIS — G31.84 MILD COGNITIVE IMPAIRMENT: Primary | ICD-10-CM

## 2023-10-25 DIAGNOSIS — R56.9 SEIZURE-LIKE ACTIVITY (HCC): ICD-10-CM

## 2023-10-25 DIAGNOSIS — I65.23 BILATERAL CAROTID ARTERY STENOSIS: ICD-10-CM

## 2023-10-25 RX ORDER — DONEPEZIL HYDROCHLORIDE 10 MG/1
10 TABLET, FILM COATED ORAL DAILY
Qty: 30 TABLET | Refills: 5 | Status: SHIPPED | OUTPATIENT
Start: 2023-10-25

## 2023-10-25 NOTE — PROGRESS NOTES
DR tablet Take 1 tablet by mouth 2 times daily (Patient not taking: Reported on 2023) 60 tablet 5    levETIRAcetam (KEPPRA XR) 500 MG TB24 extended release tablet Take 1 tablet by mouth daily for 14 days (Patient not taking: Reported on 2023) 14 tablet 0    clopidogrel (PLAVIX) 75 MG tablet Take 1 tablet by mouth daily for 18 doses (Patient not taking: Reported on 2023) 30 tablet 3    fenofibrate (TRIGLIDE) 160 MG tablet Take 160 mg by mouth daily (Patient not taking: Reported on 2023)       No current facility-administered medications for this visit. Physical Exam:  Also present during visit: daughter. Mental Status: A&O to self, location, month, NAD, speech clear, language fluent, repetition and naming intact, follows commands appropriately, unable to spell world backwards, unable to do simple math problems     Cranial Nerve Exam:   CN II-XII: PERRL, VFF, no nystagmus, no gaze paresis, sensation V1-V3 intact b/l, muscles of facial expression symmetric; hearing intact to conversational tone, palate elevates symmetrically, shoulder elevation symmetric and tongue protrudes midline with movement side to side.      Motor Exam:       Strength 5/5 UE's/LE's b/l  Tone and bulk normal   No pronator drift     Deep Tendon Reflexes: 2/4 biceps, triceps, brachioradialis, patellar, and achilles b/l; flexor plantar responses b/l     Sensation: Intact light touch/pinprick/vibration UE's/LE's b/l     Coordination/Cerebellum:       Tremors--none      Rapidly alternating movements: no dysdiadochokinesia b/l                Heel-to-Shin: no dysmetria b/l      Finger-to-Nose: no dysmetria b/l     Gait and stance:      Gait: steady, short steps    27 Point MMSE Screen:   Orientation (Time): 3   Orientation (Place): 5/   Learning 3 Objects: 3/3   Attention: 4/5   Recall 3 Objects: 0/3   Namin/2   Repitition: /   3-Step Command: 3/3   TOTAL:     BP (!) 142/82 (Site: Left Upper Arm, Position:

## 2023-11-07 ENCOUNTER — TELEPHONE (OUTPATIENT)
Dept: NEUROLOGY | Age: 78
End: 2023-11-07

## 2023-11-07 NOTE — TELEPHONE ENCOUNTER
Patient is asking if there is another medication he can try taking for seizures . Daughter  Sarah Carey states he quit taking Keppra and Depakote due to side effects . Patient is willing to even try lower dose of what he previously tried if nothing else .  Sarah Carey is on HIPPA  dated 03/07/23  Medication can be sent to [id number]- [id number] [address]. Fariha Yesi [address] [facility]

## 2023-11-08 DIAGNOSIS — R56.9 SEIZURES (HCC): Primary | ICD-10-CM

## 2023-12-29 DIAGNOSIS — G31.84 MILD COGNITIVE IMPAIRMENT: Primary | ICD-10-CM

## 2023-12-29 RX ORDER — MEMANTINE HYDROCHLORIDE 10 MG/1
10 TABLET ORAL 2 TIMES DAILY
Qty: 180 TABLET | Refills: 1 | Status: SHIPPED | OUTPATIENT
Start: 2023-12-29

## 2023-12-29 NOTE — TELEPHONE ENCOUNTER
Last ov 10/25/23, next ov due in April 2024. Rx pending.      Requested Prescriptions     Pending Prescriptions Disp Refills    memantine (NAMENDA) 10 MG tablet [Pharmacy Med Name: MEMANTINE HCL 10 MG TABLET] 180 tablet 1     Sig: TAKE 1 TABLET BY MOUTH 2 TIMES DAILY NOTE TO PHARMACY: DO NOT FILL UNTIL 5 MG RX IS COMPLETED

## 2023-12-30 NOTE — PROGRESS NOTES
Patient given instructions over telephone on Carotid Angiogram for Dx: Carotid Disease   Procedure is scheduled for 3/13/23.@ 7:00, w/arrival @ 5:45, @ [facility]. Pre-admission orders are in [facility] for labwork and/or CXR, which are due 3/9/23 @ [address]. Patient advised to review instructions given. Patient was notified that procedure date or time could be changed due to an emergency. Patient voiced understanding.
headache